# Patient Record
Sex: MALE | Race: WHITE | Employment: OTHER | URBAN - METROPOLITAN AREA
[De-identification: names, ages, dates, MRNs, and addresses within clinical notes are randomized per-mention and may not be internally consistent; named-entity substitution may affect disease eponyms.]

---

## 2017-01-12 DIAGNOSIS — E78.2 MIXED HYPERLIPIDEMIA: Primary | ICD-10-CM

## 2017-01-12 RX ORDER — ATORVASTATIN CALCIUM 20 MG/1
TABLET, FILM COATED ORAL
Qty: 90 TABLET | Refills: 0 | Status: SHIPPED | OUTPATIENT
Start: 2017-01-12 | End: 2017-03-02

## 2017-01-19 ENCOUNTER — OFFICE VISIT (OUTPATIENT)
Dept: FAMILY MEDICINE CLINIC | Facility: CLINIC | Age: 67
End: 2017-01-19

## 2017-01-19 ENCOUNTER — APPOINTMENT (OUTPATIENT)
Dept: LAB | Age: 67
End: 2017-01-19
Attending: FAMILY MEDICINE
Payer: COMMERCIAL

## 2017-01-19 VITALS
HEIGHT: 68.5 IN | SYSTOLIC BLOOD PRESSURE: 110 MMHG | DIASTOLIC BLOOD PRESSURE: 78 MMHG | RESPIRATION RATE: 16 BRPM | TEMPERATURE: 98 F | HEART RATE: 74 BPM | WEIGHT: 216 LBS | BODY MASS INDEX: 32.36 KG/M2

## 2017-01-19 DIAGNOSIS — E78.00 HYPERCHOLESTEROLEMIA: ICD-10-CM

## 2017-01-19 DIAGNOSIS — F41.1 GENERALIZED ANXIETY DISORDER: ICD-10-CM

## 2017-01-19 DIAGNOSIS — E78.00 HYPERCHOLESTEROLEMIA: Primary | ICD-10-CM

## 2017-01-19 LAB
ALBUMIN SERPL-MCNC: 4 G/DL (ref 3.5–4.8)
ALP LIVER SERPL-CCNC: 98 U/L (ref 45–117)
ALT SERPL-CCNC: 26 U/L (ref 17–63)
AST SERPL-CCNC: 17 U/L (ref 15–41)
BILIRUB SERPL-MCNC: 0.4 MG/DL (ref 0.1–2)
BUN BLD-MCNC: 16 MG/DL (ref 8–20)
CALCIUM BLD-MCNC: 9.1 MG/DL (ref 8.3–10.3)
CHLORIDE: 107 MMOL/L (ref 101–111)
CHOLEST SMN-MCNC: 171 MG/DL (ref ?–200)
CO2: 28 MMOL/L (ref 22–32)
CREAT BLD-MCNC: 1.15 MG/DL (ref 0.7–1.3)
GLUCOSE BLD-MCNC: 104 MG/DL (ref 70–99)
HDLC SERPL-MCNC: 43 MG/DL (ref 45–?)
HDLC SERPL: 3.98 {RATIO} (ref ?–4.97)
LDLC SERPL CALC-MCNC: 86 MG/DL (ref ?–130)
M PROTEIN MFR SERPL ELPH: 7.5 G/DL (ref 6.1–8.3)
NONHDLC SERPL-MCNC: 128 MG/DL (ref ?–130)
POTASSIUM SERPL-SCNC: 4.2 MMOL/L (ref 3.6–5.1)
SODIUM SERPL-SCNC: 142 MMOL/L (ref 136–144)
TRIGLYCERIDES: 209 MG/DL (ref ?–150)
VLDL: 42 MG/DL (ref 5–40)

## 2017-01-19 PROCEDURE — 99213 OFFICE O/P EST LOW 20 MIN: CPT | Performed by: FAMILY MEDICINE

## 2017-01-19 PROCEDURE — 80061 LIPID PANEL: CPT

## 2017-01-19 PROCEDURE — 36415 COLL VENOUS BLD VENIPUNCTURE: CPT

## 2017-01-19 PROCEDURE — 80053 COMPREHEN METABOLIC PANEL: CPT

## 2017-01-19 RX ORDER — ATORVASTATIN CALCIUM 20 MG/1
TABLET, FILM COATED ORAL
Qty: 90 TABLET | Refills: 1 | OUTPATIENT
Start: 2017-01-19

## 2017-01-19 RX ORDER — CHOLECALCIFEROL (VITAMIN D3) 50 MCG
TABLET ORAL
COMMUNITY

## 2017-01-19 NOTE — PROGRESS NOTES
[facility] Family Medicine Office Note  Chief Complaint:   Patient presents with:  Medication Follow-Up      HPI:   This is a 73year old male coming in for follow-up of hyperlipidemia and anxiety.       1.  Hyperlipidemia -cholesterol is controll Meds:    Current Outpatient Prescriptions:  Cholecalciferol (VITAMIN D) 2000 UNITS Oral Tab Take by mouth. Disp:  Rfl:    Atorvastatin Calcium 20 MG Oral Tab TAKE 1 TABLET (20 MG TOTAL) BY MOUTH NIGHTLY.  Disp: 90 tablet Rfl: 0   aspirin 81 MG Oral Tab Take continuation of low carb/low fat diet and exercise  -  Repeat fasting lipid panel today  -  F/u in 6 months for annual physical exam    2.  Generalized anxiety disorder  -  Stable  -  Continue zoloft 100mg daily and xanax PRN  -  F/u in 6 months      Meds &

## 2017-01-20 ENCOUNTER — TELEPHONE (OUTPATIENT)
Dept: FAMILY MEDICINE CLINIC | Facility: CLINIC | Age: 67
End: 2017-01-20

## 2017-01-20 DIAGNOSIS — R73.01 IMPAIRED FASTING GLUCOSE: ICD-10-CM

## 2017-01-20 DIAGNOSIS — E78.00 HYPERCHOLESTEROLEMIA: Primary | ICD-10-CM

## 2017-02-13 ENCOUNTER — TELEPHONE (OUTPATIENT)
Dept: FAMILY MEDICINE CLINIC | Facility: CLINIC | Age: 67
End: 2017-02-13

## 2017-03-02 RX ORDER — ATORVASTATIN CALCIUM 20 MG/1
TABLET, FILM COATED ORAL
Qty: 90 TABLET | Refills: 0 | Status: SHIPPED | OUTPATIENT
Start: 2017-03-02 | End: 2017-06-08

## 2017-06-08 RX ORDER — ATORVASTATIN CALCIUM 20 MG/1
TABLET, FILM COATED ORAL
Qty: 90 TABLET | Refills: 0 | Status: SHIPPED | OUTPATIENT
Start: 2017-06-08 | End: 2017-06-30

## 2017-06-29 ENCOUNTER — MA CHART PREP (OUTPATIENT)
Dept: FAMILY MEDICINE CLINIC | Facility: CLINIC | Age: 67
End: 2017-06-29

## 2017-06-29 PROBLEM — E66.811 OBESITY, CLASS I, BMI 30-34.9: Status: ACTIVE | Noted: 2017-06-29

## 2017-06-29 PROBLEM — E66.9 OBESITY, CLASS I, BMI 30-34.9: Status: ACTIVE | Noted: 2017-06-29

## 2017-06-30 ENCOUNTER — OFFICE VISIT (OUTPATIENT)
Dept: FAMILY MEDICINE CLINIC | Facility: CLINIC | Age: 67
End: 2017-06-30

## 2017-06-30 ENCOUNTER — APPOINTMENT (OUTPATIENT)
Dept: LAB | Age: 67
End: 2017-06-30
Attending: FAMILY MEDICINE
Payer: MEDICARE

## 2017-06-30 VITALS
HEART RATE: 90 BPM | TEMPERATURE: 98 F | RESPIRATION RATE: 14 BRPM | HEIGHT: 68.5 IN | SYSTOLIC BLOOD PRESSURE: 120 MMHG | BODY MASS INDEX: 31.91 KG/M2 | WEIGHT: 213 LBS | DIASTOLIC BLOOD PRESSURE: 80 MMHG

## 2017-06-30 DIAGNOSIS — E78.00 HYPERCHOLESTEROLEMIA: ICD-10-CM

## 2017-06-30 DIAGNOSIS — R73.01 IMPAIRED FASTING GLUCOSE: ICD-10-CM

## 2017-06-30 DIAGNOSIS — Z11.59 NEED FOR HEPATITIS C SCREENING TEST: ICD-10-CM

## 2017-06-30 DIAGNOSIS — Z12.11 COLON CANCER SCREENING: ICD-10-CM

## 2017-06-30 DIAGNOSIS — E66.9 OBESITY, CLASS I, BMI 30-34.9: ICD-10-CM

## 2017-06-30 DIAGNOSIS — Z12.5 SPECIAL SCREENING FOR MALIGNANT NEOPLASM OF PROSTATE: ICD-10-CM

## 2017-06-30 DIAGNOSIS — I70.0 AORTIC CALCIFICATION (HCC): ICD-10-CM

## 2017-06-30 DIAGNOSIS — Z00.00 ENCOUNTER FOR MEDICARE ANNUAL WELLNESS EXAM: Primary | ICD-10-CM

## 2017-06-30 DIAGNOSIS — Z00.00 ROUTINE GENERAL MEDICAL EXAMINATION AT A HEALTH CARE FACILITY: ICD-10-CM

## 2017-06-30 DIAGNOSIS — Z23 NEED FOR VACCINATION: ICD-10-CM

## 2017-06-30 DIAGNOSIS — Z12.5 SCREENING FOR PROSTATE CANCER: ICD-10-CM

## 2017-06-30 DIAGNOSIS — F41.1 GENERALIZED ANXIETY DISORDER: ICD-10-CM

## 2017-06-30 LAB
25-HYDROXYVITAMIN D (TOTAL): 34.8 NG/ML (ref 30–100)
ALBUMIN SERPL-MCNC: 4 G/DL (ref 3.5–4.8)
ALP LIVER SERPL-CCNC: 89 U/L (ref 45–117)
ALT SERPL-CCNC: 27 U/L (ref 17–63)
AST SERPL-CCNC: 14 U/L (ref 15–41)
BILIRUB SERPL-MCNC: 0.6 MG/DL (ref 0.1–2)
BILIRUB UR QL STRIP.AUTO: NEGATIVE
BUN BLD-MCNC: 16 MG/DL (ref 8–20)
CALCIUM BLD-MCNC: 9.3 MG/DL (ref 8.3–10.3)
CHLORIDE: 106 MMOL/L (ref 101–111)
CHOLEST SMN-MCNC: 162 MG/DL (ref ?–200)
CLARITY UR REFRACT.AUTO: CLEAR
CO2: 28 MMOL/L (ref 22–32)
COLOR UR AUTO: YELLOW
CREAT BLD-MCNC: 1.25 MG/DL (ref 0.7–1.3)
EST. AVERAGE GLUCOSE BLD GHB EST-MCNC: 126 MG/DL (ref 68–126)
GLUCOSE BLD-MCNC: 101 MG/DL (ref 70–99)
GLUCOSE UR STRIP.AUTO-MCNC: NEGATIVE MG/DL
HBA1C MFR BLD HPLC: 6 % (ref ?–5.7)
HDLC SERPL-MCNC: 40 MG/DL (ref 45–?)
HDLC SERPL: 4.05 {RATIO} (ref ?–4.97)
HEPATITIS C VIRUS AB INTERPRETATION: NONREACTIVE
KETONES UR STRIP.AUTO-MCNC: NEGATIVE MG/DL
LDLC SERPL CALC-MCNC: 68 MG/DL (ref ?–130)
LEUKOCYTE ESTERASE UR QL STRIP.AUTO: NEGATIVE
M PROTEIN MFR SERPL ELPH: 7.7 G/DL (ref 6.1–8.3)
NITRITE UR QL STRIP.AUTO: NEGATIVE
NONHDLC SERPL-MCNC: 122 MG/DL (ref ?–130)
PH UR STRIP.AUTO: 5 [PH] (ref 4.5–8)
POTASSIUM SERPL-SCNC: 4.4 MMOL/L (ref 3.6–5.1)
PROT UR STRIP.AUTO-MCNC: NEGATIVE MG/DL
PSA SERPL-MCNC: 1.79 NG/ML (ref 0.01–4)
RBC UR QL AUTO: NEGATIVE
SODIUM SERPL-SCNC: 141 MMOL/L (ref 136–144)
SP GR UR STRIP.AUTO: 1.02 (ref 1–1.03)
TRIGLYCERIDES: 271 MG/DL (ref ?–150)
TSI SER-ACNC: 2.61 MIU/ML (ref 0.35–5.5)
UROBILINOGEN UR STRIP.AUTO-MCNC: <2 MG/DL
VLDL: 54 MG/DL (ref 5–40)

## 2017-06-30 PROCEDURE — 36415 COLL VENOUS BLD VENIPUNCTURE: CPT | Performed by: FAMILY MEDICINE

## 2017-06-30 PROCEDURE — 80053 COMPREHEN METABOLIC PANEL: CPT | Performed by: FAMILY MEDICINE

## 2017-06-30 PROCEDURE — G0103 PSA SCREENING: HCPCS | Performed by: FAMILY MEDICINE

## 2017-06-30 PROCEDURE — 83036 HEMOGLOBIN GLYCOSYLATED A1C: CPT | Performed by: FAMILY MEDICINE

## 2017-06-30 PROCEDURE — 96160 PT-FOCUSED HLTH RISK ASSMT: CPT | Performed by: FAMILY MEDICINE

## 2017-06-30 PROCEDURE — 90670 PCV13 VACCINE IM: CPT | Performed by: FAMILY MEDICINE

## 2017-06-30 PROCEDURE — 82306 VITAMIN D 25 HYDROXY: CPT | Performed by: FAMILY MEDICINE

## 2017-06-30 PROCEDURE — G0009 ADMIN PNEUMOCOCCAL VACCINE: HCPCS | Performed by: FAMILY MEDICINE

## 2017-06-30 PROCEDURE — 84443 ASSAY THYROID STIM HORMONE: CPT | Performed by: FAMILY MEDICINE

## 2017-06-30 PROCEDURE — G0402 INITIAL PREVENTIVE EXAM: HCPCS | Performed by: FAMILY MEDICINE

## 2017-06-30 PROCEDURE — G0472 HEP C SCREEN HIGH RISK/OTHER: HCPCS | Performed by: FAMILY MEDICINE

## 2017-06-30 PROCEDURE — 81003 URINALYSIS AUTO W/O SCOPE: CPT | Performed by: FAMILY MEDICINE

## 2017-06-30 PROCEDURE — 99397 PER PM REEVAL EST PAT 65+ YR: CPT | Performed by: FAMILY MEDICINE

## 2017-06-30 PROCEDURE — 80061 LIPID PANEL: CPT | Performed by: FAMILY MEDICINE

## 2017-06-30 RX ORDER — SERTRALINE HYDROCHLORIDE 100 MG/1
100 TABLET, FILM COATED ORAL DAILY
Qty: 90 TABLET | Refills: 0 | Status: SHIPPED | OUTPATIENT
Start: 2017-06-30 | End: 2017-08-18

## 2017-06-30 RX ORDER — ALPRAZOLAM 0.25 MG/1
0.25 TABLET ORAL NIGHTLY PRN
Qty: 90 TABLET | Refills: 0 | Status: SHIPPED | OUTPATIENT
Start: 2017-06-30

## 2017-06-30 RX ORDER — ATORVASTATIN CALCIUM 20 MG/1
TABLET, FILM COATED ORAL
Qty: 90 TABLET | Refills: 0 | Status: SHIPPED | OUTPATIENT
Start: 2017-06-30 | End: 2017-08-18

## 2017-06-30 NOTE — PATIENT INSTRUCTIONS
Jaymie Wolf's SCREENING SCHEDULE   Tests on this list are recommended by your physician but may not be covered, or covered at this frequency, by your insurer. Please check with your insurance carrier before scheduling to verify coverage.     PREVENTATIV No results found for this or any previous visit.  Limited to patients who meet one of the following criteria:   • Men who are 73-68 years old and have smoked more than 100 cigarettes in their lifetime   • Anyone with a family history    Colorectal Cancer Sc for the mentally retarded   Persons who live in the same house as a HepB virus carrier   Homosexual men   Illicit injectable drug abusers     Tetanus Toxoid- Only covered with a cut with metal- TD and TDaP Not covered by Medicare Part B) No orders found fo 89 mm Hg   Colorectal cancer All men in this age group Flexible sigmoidoscopy every 5 years, or colonoscopy every 10 years, or double-contrast barium enema every 5 years; yearly fecal occult blood test or fecal immunochemical test; or a stool DNA test as o healthcare provider 2 doses given at least 6 months apart   Hepatitis B Men at increased risk for infection – talk with your healthcare provider 3 doses over 6 months; second dose should be given 1 month after the first dose; the third dose should be given

## 2017-06-30 NOTE — PROGRESS NOTES
HPI:   Princess Teague is a 73year old male who presents for a MA (Medicare Advantage) [address] [facility] (Once per calendar year). Patient has no complaints or concerns today. Denies any recent illnesses or hospitalizations.   His tetanus vaccine is up-to-nolan (6/12); URI (upper respiratory infection) (2/29/12); and Vascular calcification (2/17/2012). He  has a past surgical history that includes cardiac catherterization (pbp) (1/24/12).     His family history includes AHD in an other family member; CABG in hi Eyes Visual Acuity: Corrected Both Eyes Chart Acuity: 20/20   Able To Tolerate Visual Acuity: Yes      General Appearance:  Alert, cooperative, no distress, appears stated age   Head:  Normocephalic, without obvious abnormality, atraumatic   Eyes:  PERRL, prevnar done today    Special screening for malignant neoplasm of prostate  -     PSA;  Future  -  Check PSA and prostate exam done today    Obesity, Class I, BMI 30-34.9  -  Encourage low fat/low carb diet and exercise    Hypercholesterolemia  -     atorva daily physical activity?: Light    How would you describe your current health state?: Fair    How do you maintain positive mental well-being?: Social Interaction; Visiting Friends    If you are a male age 38-65 or a female age 47-67, do you take aspirin?: Y your progress note to see your input here.   Cognitive Assessment     What day of the week is this?: Correct    What month is it?: Correct    What year is it?: Correct    Recall \"Ball\": Correct    Recall \"Flag\": Correct    Recall \"Tree\": Correct previous visit. Hepatitis B No orders found for this or any previous visit. Tetanus No orders found for this or any previous visit.          SPECIFIC DISEASE MONITORING Internal Lab or Procedure External Lab or Procedure   Annual Monitoring of Persi

## 2017-07-03 DIAGNOSIS — E78.00 HYPERCHOLESTEROLEMIA: ICD-10-CM

## 2017-07-03 DIAGNOSIS — R73.01 IMPAIRED FASTING GLUCOSE: Primary | ICD-10-CM

## 2017-08-11 ENCOUNTER — TELEPHONE (OUTPATIENT)
Dept: FAMILY MEDICINE CLINIC | Facility: CLINIC | Age: 67
End: 2017-08-11

## 2017-08-11 NOTE — TELEPHONE ENCOUNTER
Pt states Xanax refill from our office was for once daily and #90 supply. Pt states Dr Ayleen Cantu , psych. fills Xanax for BID. 's instructions is that pt needs to get Xanax filled by Dr Ayleen Cantu in the future. Pt voices understanding.   This message is DIMITRIS

## 2017-08-18 DIAGNOSIS — F41.1 GENERALIZED ANXIETY DISORDER: ICD-10-CM

## 2017-08-18 DIAGNOSIS — E78.00 HYPERCHOLESTEROLEMIA: ICD-10-CM

## 2017-08-18 RX ORDER — ATORVASTATIN CALCIUM 20 MG/1
TABLET, FILM COATED ORAL
Qty: 90 TABLET | Refills: 0 | Status: SHIPPED | OUTPATIENT
Start: 2017-08-18 | End: 2017-10-25

## 2017-08-18 RX ORDER — SERTRALINE HYDROCHLORIDE 100 MG/1
TABLET, FILM COATED ORAL
Qty: 90 TABLET | Refills: 0 | Status: SHIPPED | OUTPATIENT
Start: 2017-08-18 | End: 2021-06-30 | Stop reason: ALTCHOICE

## 2017-09-05 ENCOUNTER — TELEPHONE (OUTPATIENT)
Dept: FAMILY MEDICINE CLINIC | Facility: CLINIC | Age: 67
End: 2017-09-05

## 2017-09-05 NOTE — TELEPHONE ENCOUNTER
Per pt , his insurance company sent him a \"FIT\" kit to collect the stool for colon CA screening. Pt states the test was mailed to [facility].  Pt states he will get a copy of the results and bring our office a copy for our records.

## 2017-10-25 DIAGNOSIS — E78.00 HYPERCHOLESTEROLEMIA: ICD-10-CM

## 2017-10-26 RX ORDER — ATORVASTATIN CALCIUM 20 MG/1
TABLET, FILM COATED ORAL
Qty: 90 TABLET | Refills: 0 | Status: SHIPPED | OUTPATIENT
Start: 2017-10-26 | End: 2017-12-13

## 2017-12-13 DIAGNOSIS — E78.00 HYPERCHOLESTEROLEMIA: ICD-10-CM

## 2017-12-13 RX ORDER — ATORVASTATIN CALCIUM 20 MG/1
TABLET, FILM COATED ORAL
Qty: 90 TABLET | Refills: 1 | Status: SHIPPED | OUTPATIENT
Start: 2017-12-13 | End: 2018-01-18

## 2018-01-18 ENCOUNTER — APPOINTMENT (OUTPATIENT)
Dept: LAB | Age: 68
End: 2018-01-18
Attending: FAMILY MEDICINE
Payer: MEDICARE

## 2018-01-18 ENCOUNTER — OFFICE VISIT (OUTPATIENT)
Dept: FAMILY MEDICINE CLINIC | Facility: CLINIC | Age: 68
End: 2018-01-18

## 2018-01-18 VITALS
HEART RATE: 80 BPM | DIASTOLIC BLOOD PRESSURE: 74 MMHG | HEIGHT: 68.5 IN | SYSTOLIC BLOOD PRESSURE: 118 MMHG | BODY MASS INDEX: 32.51 KG/M2 | WEIGHT: 217 LBS | TEMPERATURE: 98 F | RESPIRATION RATE: 16 BRPM

## 2018-01-18 DIAGNOSIS — R73.01 IMPAIRED FASTING GLUCOSE: ICD-10-CM

## 2018-01-18 DIAGNOSIS — E78.2 MIXED HYPERLIPIDEMIA: Primary | ICD-10-CM

## 2018-01-18 DIAGNOSIS — E78.00 HYPERCHOLESTEROLEMIA: ICD-10-CM

## 2018-01-18 DIAGNOSIS — E78.2 MIXED HYPERLIPIDEMIA: ICD-10-CM

## 2018-01-18 LAB
ALBUMIN SERPL-MCNC: 3.8 G/DL (ref 3.5–4.8)
ALP LIVER SERPL-CCNC: 94 U/L (ref 45–117)
ALT SERPL-CCNC: 20 U/L (ref 17–63)
AST SERPL-CCNC: 15 U/L (ref 15–41)
BILIRUB SERPL-MCNC: 0.6 MG/DL (ref 0.1–2)
BUN BLD-MCNC: 17 MG/DL (ref 8–20)
CALCIUM BLD-MCNC: 8.5 MG/DL (ref 8.3–10.3)
CHLORIDE: 107 MMOL/L (ref 101–111)
CHOLEST SMN-MCNC: 146 MG/DL (ref ?–200)
CO2: 25 MMOL/L (ref 22–32)
CREAT BLD-MCNC: 1.19 MG/DL (ref 0.7–1.3)
EST. AVERAGE GLUCOSE BLD GHB EST-MCNC: 120 MG/DL (ref 68–126)
GLUCOSE BLD-MCNC: 99 MG/DL (ref 70–99)
HBA1C MFR BLD HPLC: 5.8 % (ref ?–5.7)
HDLC SERPL-MCNC: 33 MG/DL (ref 45–?)
HDLC SERPL: 4.42 {RATIO} (ref ?–4.97)
LDLC SERPL CALC-MCNC: 61 MG/DL (ref ?–130)
M PROTEIN MFR SERPL ELPH: 7.4 G/DL (ref 6.1–8.3)
NONHDLC SERPL-MCNC: 113 MG/DL (ref ?–130)
POTASSIUM SERPL-SCNC: 4.1 MMOL/L (ref 3.6–5.1)
SODIUM SERPL-SCNC: 140 MMOL/L (ref 136–144)
TRIGL SERPL-MCNC: 260 MG/DL (ref ?–150)
VLDLC SERPL CALC-MCNC: 52 MG/DL (ref 5–40)

## 2018-01-18 PROCEDURE — 36415 COLL VENOUS BLD VENIPUNCTURE: CPT | Performed by: FAMILY MEDICINE

## 2018-01-18 PROCEDURE — 80061 LIPID PANEL: CPT | Performed by: FAMILY MEDICINE

## 2018-01-18 PROCEDURE — 90653 IIV ADJUVANT VACCINE IM: CPT | Performed by: FAMILY MEDICINE

## 2018-01-18 PROCEDURE — 83036 HEMOGLOBIN GLYCOSYLATED A1C: CPT | Performed by: FAMILY MEDICINE

## 2018-01-18 PROCEDURE — 99213 OFFICE O/P EST LOW 20 MIN: CPT | Performed by: FAMILY MEDICINE

## 2018-01-18 PROCEDURE — G0008 ADMIN INFLUENZA VIRUS VAC: HCPCS | Performed by: FAMILY MEDICINE

## 2018-01-18 PROCEDURE — 80053 COMPREHEN METABOLIC PANEL: CPT | Performed by: FAMILY MEDICINE

## 2018-01-18 RX ORDER — ATORVASTATIN CALCIUM 20 MG/1
TABLET, FILM COATED ORAL
Qty: 90 TABLET | Refills: 1 | Status: SHIPPED | OUTPATIENT
Start: 2018-01-18 | End: 2018-01-19

## 2018-01-18 NOTE — PROGRESS NOTES
[facility] Family Medicine Office Note  Chief Complaint:   Patient presents with:  Medication Follow-Up: anxiety depression, cholesterol       HPI:   This is a 78year old male coming in for follow-up of hyperlipidemia.   Cholesterol is controlled Tab Take 1 tablet by mouth  daily Disp: 90 tablet Rfl: 0   ALPRAZolam 0.25 MG Oral Tab Take 1 tablet (0.25 mg total) by mouth nightly as needed. Disp: 90 tablet Rfl: 0   Cholecalciferol (VITAMIN D) 2000 UNITS Oral Tab Take by mouth.  Disp:  Rfl:    aspirin exam      Meds & Refills for this Visit:    Signed Prescriptions Disp Refills    atorvastatin 20 MG Oral Tab 90 tablet 1      Sig: TAKE 1 TABLET BY MOUTH  NIGHTLY           Health Maintenance:  Colonoscopy,10 Years due on 12/11/2001    Patient/Caregiver Ed

## 2018-01-19 ENCOUNTER — TELEPHONE (OUTPATIENT)
Dept: FAMILY MEDICINE CLINIC | Facility: CLINIC | Age: 68
End: 2018-01-19

## 2018-01-19 DIAGNOSIS — E78.2 MIXED HYPERLIPIDEMIA: ICD-10-CM

## 2018-01-19 RX ORDER — ATORVASTATIN CALCIUM 20 MG/1
TABLET, FILM COATED ORAL
Qty: 90 TABLET | Refills: 1 | Status: SHIPPED | OUTPATIENT
Start: 2018-01-19 | End: 2018-01-26

## 2018-01-19 NOTE — TELEPHONE ENCOUNTER
Pt saw Dr. Jody Arroyo yesterday and received a refill of atorvastatin 20 MG Oral Tab; however, refill was sent to [facility]. Pt requested that this script be filled through [address] [place] [address], [address] [place] [address] [phone], [phone].     Pt

## 2018-01-19 NOTE — TELEPHONE ENCOUNTER
Record confirms info noted below by pt-rx was for #90 RFx1. Redo of same atorvastatin order done now to mail order pharmacy as pt requests.

## 2018-01-26 RX ORDER — ATORVASTATIN CALCIUM 20 MG/1
TABLET, FILM COATED ORAL
Qty: 90 TABLET | Refills: 1 | Status: SHIPPED | OUTPATIENT
Start: 2018-01-26 | End: 2018-07-09

## 2018-01-26 RX ORDER — ATORVASTATIN CALCIUM 20 MG/1
TABLET, FILM COATED ORAL
Qty: 90 TABLET | Refills: 1 | Status: SHIPPED | OUTPATIENT
Start: 2018-01-26 | End: 2018-01-26

## 2018-01-26 NOTE — TELEPHONE ENCOUNTER
Pt called stating he still does not have his atorvastatin from [facility]. States he spoke with [facility] 1/19 and they relayed they still did not have a request for the medication and nothing has arrived via mail to pt as of yet.  Informed pt that this script was

## 2018-01-26 NOTE — TELEPHONE ENCOUNTER
I have called [facility] on this. They had rec'd our requests but pt insurance is saying it is too soon to fill bc he has been charged by [facility] for the rx that was sent already 1/19. Per below pt did not .  Per [facility] we need to contact [facility] and have th

## 2018-05-31 ENCOUNTER — PATIENT OUTREACH (OUTPATIENT)
Dept: FAMILY MEDICINE CLINIC | Facility: CLINIC | Age: 68
End: 2018-05-31

## 2018-05-31 NOTE — PROGRESS NOTES
Please call pt to inquire if pt has had a colonoscopy in the last 10 years and if so who performed it (name and phone #). Please let Genejody Larger know so I can obtain the report.     If pt did not have a colonoscopy please advise them we will leave the FIT stoo

## 2018-06-13 DIAGNOSIS — E78.2 MIXED HYPERLIPIDEMIA: ICD-10-CM

## 2018-06-13 RX ORDER — ATORVASTATIN CALCIUM 20 MG/1
TABLET, FILM COATED ORAL
Qty: 90 TABLET | Refills: 0 | Status: SHIPPED | OUTPATIENT
Start: 2018-06-13 | End: 2018-08-12

## 2018-07-09 ENCOUNTER — APPOINTMENT (OUTPATIENT)
Dept: LAB | Age: 68
End: 2018-07-09
Attending: FAMILY MEDICINE
Payer: MEDICARE

## 2018-07-09 ENCOUNTER — OFFICE VISIT (OUTPATIENT)
Dept: FAMILY MEDICINE CLINIC | Facility: CLINIC | Age: 68
End: 2018-07-09

## 2018-07-09 VITALS
WEIGHT: 214 LBS | RESPIRATION RATE: 16 BRPM | BODY MASS INDEX: 32.06 KG/M2 | SYSTOLIC BLOOD PRESSURE: 120 MMHG | HEART RATE: 74 BPM | TEMPERATURE: 98 F | HEIGHT: 68.5 IN | DIASTOLIC BLOOD PRESSURE: 80 MMHG

## 2018-07-09 DIAGNOSIS — E78.2 MIXED HYPERLIPIDEMIA: ICD-10-CM

## 2018-07-09 DIAGNOSIS — I70.0 AORTIC CALCIFICATION (HCC): ICD-10-CM

## 2018-07-09 DIAGNOSIS — Z12.5 PROSTATE CANCER SCREENING: ICD-10-CM

## 2018-07-09 DIAGNOSIS — Z12.11 SCREENING FOR COLON CANCER: ICD-10-CM

## 2018-07-09 DIAGNOSIS — E66.9 OBESITY, CLASS I, BMI 30-34.9: ICD-10-CM

## 2018-07-09 DIAGNOSIS — R73.01 IMPAIRED FASTING GLUCOSE: ICD-10-CM

## 2018-07-09 DIAGNOSIS — Z23 NEED FOR VACCINATION: ICD-10-CM

## 2018-07-09 DIAGNOSIS — F41.1 GENERALIZED ANXIETY DISORDER: ICD-10-CM

## 2018-07-09 DIAGNOSIS — Z00.00 ENCOUNTER FOR MEDICARE ANNUAL WELLNESS EXAM: Primary | ICD-10-CM

## 2018-07-09 LAB
ALBUMIN SERPL-MCNC: 3.7 G/DL (ref 3.5–4.8)
ALP LIVER SERPL-CCNC: 82 U/L (ref 45–117)
ALT SERPL-CCNC: 21 U/L (ref 17–63)
AST SERPL-CCNC: 14 U/L (ref 15–41)
BILIRUB SERPL-MCNC: 0.6 MG/DL (ref 0.1–2)
BUN BLD-MCNC: 15 MG/DL (ref 8–20)
CALCIUM BLD-MCNC: 8.9 MG/DL (ref 8.3–10.3)
CHLORIDE: 108 MMOL/L (ref 101–111)
CHOLEST SMN-MCNC: 124 MG/DL (ref ?–200)
CO2: 24 MMOL/L (ref 22–32)
COMPLEXED PSA SERPL-MCNC: 1.88 NG/ML (ref 0.01–4)
CREAT BLD-MCNC: 1.14 MG/DL (ref 0.7–1.3)
EST. AVERAGE GLUCOSE BLD GHB EST-MCNC: 120 MG/DL (ref 68–126)
GLUCOSE BLD-MCNC: 106 MG/DL (ref 70–99)
HBA1C MFR BLD HPLC: 5.8 % (ref ?–5.7)
HDLC SERPL-MCNC: 35 MG/DL (ref 45–?)
HDLC SERPL: 3.54 {RATIO} (ref ?–4.97)
LDLC SERPL CALC-MCNC: 52 MG/DL (ref ?–130)
M PROTEIN MFR SERPL ELPH: 7.1 G/DL (ref 6.1–8.3)
NONHDLC SERPL-MCNC: 89 MG/DL (ref ?–130)
POTASSIUM SERPL-SCNC: 4.3 MMOL/L (ref 3.6–5.1)
SODIUM SERPL-SCNC: 141 MMOL/L (ref 136–144)
TRIGL SERPL-MCNC: 183 MG/DL (ref ?–150)
VLDLC SERPL CALC-MCNC: 37 MG/DL (ref 5–40)

## 2018-07-09 PROCEDURE — 90732 PPSV23 VACC 2 YRS+ SUBQ/IM: CPT | Performed by: FAMILY MEDICINE

## 2018-07-09 PROCEDURE — 36415 COLL VENOUS BLD VENIPUNCTURE: CPT

## 2018-07-09 PROCEDURE — 99397 PER PM REEVAL EST PAT 65+ YR: CPT | Performed by: FAMILY MEDICINE

## 2018-07-09 PROCEDURE — 83036 HEMOGLOBIN GLYCOSYLATED A1C: CPT

## 2018-07-09 PROCEDURE — G0009 ADMIN PNEUMOCOCCAL VACCINE: HCPCS | Performed by: FAMILY MEDICINE

## 2018-07-09 PROCEDURE — 80053 COMPREHEN METABOLIC PANEL: CPT

## 2018-07-09 PROCEDURE — G0438 PPPS, INITIAL VISIT: HCPCS | Performed by: FAMILY MEDICINE

## 2018-07-09 PROCEDURE — 96160 PT-FOCUSED HLTH RISK ASSMT: CPT | Performed by: FAMILY MEDICINE

## 2018-07-09 PROCEDURE — 80061 LIPID PANEL: CPT

## 2018-07-09 NOTE — PATIENT INSTRUCTIONS
Max Wolf's SCREENING SCHEDULE   Tests on this list are recommended by your physician but may not be covered, or covered at this frequency, by your insurer. Please check with your insurance carrier before scheduling to verify coverage.     PREVENTATIV results found for this or any previous visit.  Limited to patients who meet one of the following criteria:   • Men who are 73-68 years old and have smoked more than 100 cigarettes in their lifetime   • Anyone with a family history    Colorectal Cancer Scree concentrates   Clients of institutions for the mentally retarded   Persons who live in the same house as a HepB virus carrier   Homosexual men   Illicit injectable drug abusers     Tetanus Toxoid- Only covered with a cut with metal- TD and TDaP Not covered

## 2018-07-09 NOTE — PROGRESS NOTES
HPI:   Marco Schulz is a 78year old male who presents for a MA (Medicare Advantage) [address] (Once per calendar year). Patient has no complaints or concerns today. Denies any recent illnesses or hospitalizations.   His tetanus vaccine is up-to-nolan calcification ([facility] [address] (1/24/12); Cardiac infarction (1/12); Hypercholesterolemia; Laboratory test (1/6/12); Lipid screening (1/4/12); Routine general medical examination at a health care facility (12/29/2011);  Stye (6/12); URI (upper respiratory infection) ( General Appearance:  Alert, cooperative, no distress, appears stated age   Head:  Normocephalic, without obvious abnormality, atraumatic   Eyes:  PERRL, conjunctiva/corneas clear, EOM's intact, both eyes   Ears:  Normal TM's and external ear ca carb diet and exercise    Mixed hyperlipidemia  -  Recheck fasting lipid panel  -  Adjust lipitor accordingly  -  Encourage low fat/low carb intake    Aortic calcification (HCC)  -  Monitored by cardiology  -  Check ultrafast heart scan    Generalized anxi help    Eating: Able without help    Driving: Able without help    Preparing your meals: Able without help    Managing money/bills: Able without help    Taking medications as prescribed: Able without help    Are you able to afford your medications?: Yes Hgb)   Date Value   08/29/2014 5.9 (H)     HgbA1C (%)   Date Value   01/18/2018 5.8 (H)       No flowsheet data found.     Fasting Blood Sugar (FSB)Annually   Glucose (mg/dL)   Date Value   01/18/2018 99   ----------  GLUCOSE (mg/dL)   Date Value   08/29/20 data found. Creatinine  Annually CREATININE (mg/dL)   Date Value   08/29/2014 1.02     Creatinine (mg/dL)   Date Value   01/18/2018 1.19    No flowsheet data found.     Drug Serum Conc  Annually No results found for: DIGOXIN, DIG, VALP No flowsheet data

## 2018-07-30 ENCOUNTER — OFFICE VISIT (OUTPATIENT)
Dept: SURGERY | Facility: CLINIC | Age: 68
End: 2018-07-30
Payer: COMMERCIAL

## 2018-07-30 VITALS
DIASTOLIC BLOOD PRESSURE: 74 MMHG | WEIGHT: 214 LBS | HEIGHT: 67 IN | HEART RATE: 70 BPM | BODY MASS INDEX: 33.59 KG/M2 | SYSTOLIC BLOOD PRESSURE: 127 MMHG | TEMPERATURE: 99 F

## 2018-07-30 DIAGNOSIS — G47.33 OBSTRUCTIVE SLEEP APNEA ON CPAP: ICD-10-CM

## 2018-07-30 DIAGNOSIS — Z12.11 ENCOUNTER FOR SCREENING COLONOSCOPY: Primary | ICD-10-CM

## 2018-07-30 DIAGNOSIS — Z99.89 OBSTRUCTIVE SLEEP APNEA ON CPAP: ICD-10-CM

## 2018-07-30 DIAGNOSIS — Z99.89 CPAP (CONTINUOUS POSITIVE AIRWAY PRESSURE) DEPENDENCE: ICD-10-CM

## 2018-07-30 PROBLEM — Z00.00 ENCOUNTER FOR MEDICARE ANNUAL WELLNESS EXAM: Status: RESOLVED | Noted: 2018-07-09 | Resolved: 2018-07-30

## 2018-07-30 RX ORDER — POLYETHYLENE GLYCOL 3350, SODIUM CHLORIDE, SODIUM BICARBONATE, POTASSIUM CHLORIDE 420; 11.2; 5.72; 1.48 G/4L; G/4L; G/4L; G/4L
POWDER, FOR SOLUTION ORAL
Qty: 1 BOTTLE | Status: SHIPPED | OUTPATIENT
Start: 2018-07-30 | End: 2019-07-10 | Stop reason: ALTCHOICE

## 2018-07-30 NOTE — H&P
New Patient Visit Note       Active Problems      1. Encounter for screening colonoscopy    2. CPAP (continuous positive airway pressure) dependence    3.  Obstructive sleep apnea on CPAP        Chief Complaint   Patient presents with:  Colonoscopy Screenin Diagnosis Date   • Acute URI 2/1/12   • Anxiety disorder    • Aortic calcification ([facility] [address] 1/24/12   • Cardiac infarction 1/12    small,nontransmural infarction involving the apical anterior wall.  No evidence of ischemia   • Hypercholesterolemia    • Labor TABLET BY MOUTH  NIGHTLY Disp: 90 tablet Rfl: 0   SERTRALINE  MG Oral Tab Take 1 tablet by mouth  daily Disp: 90 tablet Rfl: 0   ALPRAZolam 0.25 MG Oral Tab Take 1 tablet (0.25 mg total) by mouth nightly as needed.  Disp: 90 tablet Rfl: 0   Cholecalc normal heart sounds. No murmur heard. Pulmonary/Chest: Effort normal and breath sounds normal. No accessory muscle usage. No tachypnea. No respiratory distress. He has no decreased breath sounds. He has no wheezes. He has no rhonchi. He has no rales.  H distention. He has not suffered weight loss as a symptom. The patient has no first-degree or second-degree relatives with colon polyps, colon cancer, or cancer the uterus. The patient has had no previous abdominal operations.   The patient does have

## 2018-07-31 NOTE — PATIENT INSTRUCTIONS
I am seeing this patient at the request of the primary care service regarding colonoscopy. The patient is 78years old, he has not had a previous colonoscopy. He has no personal history of polyps or colon cancer.     He has no blood in the stool or me

## 2018-08-12 DIAGNOSIS — E78.2 MIXED HYPERLIPIDEMIA: ICD-10-CM

## 2018-08-13 RX ORDER — ATORVASTATIN CALCIUM 20 MG/1
TABLET, FILM COATED ORAL
Qty: 90 TABLET | Refills: 0 | Status: SHIPPED | OUTPATIENT
Start: 2018-08-13 | End: 2018-10-25

## 2018-08-31 ENCOUNTER — TELEPHONE (OUTPATIENT)
Dept: SURGERY | Facility: CLINIC | Age: 68
End: 2018-08-31

## 2018-08-31 NOTE — TELEPHONE ENCOUNTER
Called in [place] at [facility] on [address] in [place], per pharmacist they are out of [place] called in [place] for pt. Pt informed.

## 2018-10-25 DIAGNOSIS — E78.2 MIXED HYPERLIPIDEMIA: ICD-10-CM

## 2018-10-25 RX ORDER — ATORVASTATIN CALCIUM 20 MG/1
TABLET, FILM COATED ORAL
Qty: 90 TABLET | Refills: 1 | Status: SHIPPED | OUTPATIENT
Start: 2018-10-25 | End: 2019-06-24

## 2019-06-24 DIAGNOSIS — E78.2 MIXED HYPERLIPIDEMIA: ICD-10-CM

## 2019-06-24 RX ORDER — ATORVASTATIN CALCIUM 20 MG/1
TABLET, FILM COATED ORAL
Qty: 90 TABLET | Refills: 1 | Status: SHIPPED | OUTPATIENT
Start: 2019-06-24 | End: 2020-01-14

## 2019-07-10 ENCOUNTER — APPOINTMENT (OUTPATIENT)
Dept: LAB | Age: 69
End: 2019-07-10
Attending: FAMILY MEDICINE
Payer: MEDICARE

## 2019-07-10 ENCOUNTER — OFFICE VISIT (OUTPATIENT)
Dept: FAMILY MEDICINE CLINIC | Facility: CLINIC | Age: 69
End: 2019-07-10
Payer: COMMERCIAL

## 2019-07-10 VITALS
HEIGHT: 68 IN | WEIGHT: 214 LBS | HEART RATE: 76 BPM | BODY MASS INDEX: 32.43 KG/M2 | RESPIRATION RATE: 16 BRPM | DIASTOLIC BLOOD PRESSURE: 70 MMHG | TEMPERATURE: 99 F | SYSTOLIC BLOOD PRESSURE: 120 MMHG

## 2019-07-10 DIAGNOSIS — E78.2 MIXED HYPERLIPIDEMIA: ICD-10-CM

## 2019-07-10 DIAGNOSIS — F41.1 GENERALIZED ANXIETY DISORDER: ICD-10-CM

## 2019-07-10 DIAGNOSIS — Z12.5 PROSTATE CANCER SCREENING: ICD-10-CM

## 2019-07-10 DIAGNOSIS — R73.01 IMPAIRED FASTING GLUCOSE: ICD-10-CM

## 2019-07-10 DIAGNOSIS — E66.9 OBESITY, CLASS I, BMI 30-34.9: ICD-10-CM

## 2019-07-10 DIAGNOSIS — G47.33 OBSTRUCTIVE SLEEP APNEA ON CPAP: ICD-10-CM

## 2019-07-10 DIAGNOSIS — Z99.89 OBSTRUCTIVE SLEEP APNEA ON CPAP: ICD-10-CM

## 2019-07-10 DIAGNOSIS — Z00.00 ENCOUNTER FOR MEDICARE ANNUAL WELLNESS EXAM: Primary | ICD-10-CM

## 2019-07-10 DIAGNOSIS — I70.0 AORTIC CALCIFICATION (HCC): ICD-10-CM

## 2019-07-10 LAB
ALBUMIN SERPL-MCNC: 3.8 G/DL (ref 3.4–5)
ALBUMIN/GLOB SERPL: 1.1 {RATIO} (ref 1–2)
ALP LIVER SERPL-CCNC: 85 U/L (ref 45–117)
ALT SERPL-CCNC: 23 U/L (ref 16–61)
ANION GAP SERPL CALC-SCNC: 6 MMOL/L (ref 0–18)
AST SERPL-CCNC: 23 U/L (ref 15–37)
BASOPHILS # BLD AUTO: 0.02 X10(3) UL (ref 0–0.2)
BASOPHILS NFR BLD AUTO: 0.4 %
BILIRUB SERPL-MCNC: 0.8 MG/DL (ref 0.1–2)
BUN BLD-MCNC: 16 MG/DL (ref 7–18)
BUN/CREAT SERPL: 14 (ref 10–20)
CALCIUM BLD-MCNC: 8.7 MG/DL (ref 8.5–10.1)
CHLORIDE SERPL-SCNC: 111 MMOL/L (ref 98–112)
CHOLEST SMN-MCNC: 150 MG/DL (ref ?–200)
CO2 SERPL-SCNC: 26 MMOL/L (ref 21–32)
CREAT BLD-MCNC: 1.14 MG/DL (ref 0.7–1.3)
DEPRECATED RDW RBC AUTO: 43.6 FL (ref 35.1–46.3)
EOSINOPHIL # BLD AUTO: 0.06 X10(3) UL (ref 0–0.7)
EOSINOPHIL NFR BLD AUTO: 1.1 %
ERYTHROCYTE [DISTWIDTH] IN BLOOD BY AUTOMATED COUNT: 12.9 % (ref 11–15)
EST. AVERAGE GLUCOSE BLD GHB EST-MCNC: 128 MG/DL (ref 68–126)
GLOBULIN PLAS-MCNC: 3.4 G/DL (ref 2.8–4.4)
GLUCOSE BLD-MCNC: 96 MG/DL (ref 70–99)
HBA1C MFR BLD HPLC: 6.1 % (ref ?–5.7)
HCT VFR BLD AUTO: 45.4 % (ref 39–53)
HDLC SERPL-MCNC: 37 MG/DL (ref 40–59)
HGB BLD-MCNC: 14.9 G/DL (ref 13–17.5)
IMM GRANULOCYTES # BLD AUTO: 0.02 X10(3) UL (ref 0–1)
IMM GRANULOCYTES NFR BLD: 0.4 %
LDLC SERPL CALC-MCNC: 84 MG/DL (ref ?–100)
LYMPHOCYTES # BLD AUTO: 1.88 X10(3) UL (ref 1–4)
LYMPHOCYTES NFR BLD AUTO: 35.5 %
M PROTEIN MFR SERPL ELPH: 7.2 G/DL (ref 6.4–8.2)
MCH RBC QN AUTO: 30.2 PG (ref 26–34)
MCHC RBC AUTO-ENTMCNC: 32.8 G/DL (ref 31–37)
MCV RBC AUTO: 92.1 FL (ref 80–100)
MONOCYTES # BLD AUTO: 0.54 X10(3) UL (ref 0.1–1)
MONOCYTES NFR BLD AUTO: 10.2 %
NEUTROPHILS # BLD AUTO: 2.77 X10 (3) UL (ref 1.5–7.7)
NEUTROPHILS # BLD AUTO: 2.77 X10(3) UL (ref 1.5–7.7)
NEUTROPHILS NFR BLD AUTO: 52.4 %
NONHDLC SERPL-MCNC: 113 MG/DL (ref ?–130)
OSMOLALITY SERPL CALC.SUM OF ELEC: 297 MOSM/KG (ref 275–295)
PLATELET # BLD AUTO: 179 10(3)UL (ref 150–450)
POTASSIUM SERPL-SCNC: 4.2 MMOL/L (ref 3.5–5.1)
PSA SERPL-MCNC: 1.78 NG/ML (ref ?–4)
RBC # BLD AUTO: 4.93 X10(6)UL (ref 3.8–5.8)
SODIUM SERPL-SCNC: 143 MMOL/L (ref 136–145)
TRIGL SERPL-MCNC: 145 MG/DL (ref 30–149)
VLDLC SERPL CALC-MCNC: 29 MG/DL (ref 0–30)
WBC # BLD AUTO: 5.3 X10(3) UL (ref 4–11)

## 2019-07-10 PROCEDURE — 99397 PER PM REEVAL EST PAT 65+ YR: CPT | Performed by: FAMILY MEDICINE

## 2019-07-10 PROCEDURE — 96160 PT-FOCUSED HLTH RISK ASSMT: CPT | Performed by: FAMILY MEDICINE

## 2019-07-10 PROCEDURE — G0439 PPPS, SUBSEQ VISIT: HCPCS | Performed by: FAMILY MEDICINE

## 2019-07-10 NOTE — PROGRESS NOTES
HPI:   Altagracia Topete is a 80year old male who presents for a MA (Medicare Advantage) [facility] (Once per calendar year). Patient has no complaints or concerns today. Denies any recent illnesses or hospitalizations.   His tetanus vaccine is up-to-nolan Cardiac infarction (1/12), Hypercholesterolemia, Laboratory test (1/6/12), Lipid screening (1/4/12), Routine general medical examination at a health care facility (12/29/2011), Stye (6/12), URI (upper respiratory infection) (2/29/12), and Vascular calcific Corrected Right Eye Chart Acuity: 20/20   Left Eye Visual Acuity: Corrected Left Eye Chart Acuity: 20/20   Both Eyes Visual Acuity: Corrected Both Eyes Chart Acuity: 20/20   Able To Tolerate Visual Acuity: Yes      General Appearance:  Alert, cooperative, Pneumovax up to date    TINA on CPAP  -  Stable  -  Continue CPAP    Special screening for malignant neoplasm of prostate  -     PSA;  Future  -  Check PSA and prostate exam done today    Obesity, Class I, BMI 30-34.9  -  Encourage low fat/low carb diet and help    Driving: Able without help    Preparing your meals: Able without help    Managing money/bills: Able without help    Taking medications as prescribed: Able without help    Are you able to afford your medications?: Yes    Hearing Problems?: No     Fu 07/09/2018 52     LDL-CHOLESTEROL (mg/dL (calc))   Date Value   08/29/2014 85        EKG - w/ Initial Preventative Physical Exam only, or if medically necessary Electrocardiogram date    Colorectal Cancer Screening      Colonoscopy Screen every 10 years Date Value   07/09/2018 5.8 (H)       No flowsheet data found.     Creat/alb ratio  Annually      LDL  Annually LDL Cholesterol (mg/dL)   Date Value   07/09/2018 52     LDL-CHOLESTEROL (mg/dL (calc))   Date Value   08/29/2014 85    No flowsheet data found

## 2020-01-14 DIAGNOSIS — E78.2 MIXED HYPERLIPIDEMIA: ICD-10-CM

## 2020-01-14 RX ORDER — ATORVASTATIN CALCIUM 20 MG/1
TABLET, FILM COATED ORAL
Qty: 90 TABLET | Refills: 0 | Status: SHIPPED | OUTPATIENT
Start: 2020-01-14 | End: 2020-06-04

## 2020-06-04 DIAGNOSIS — E78.2 MIXED HYPERLIPIDEMIA: ICD-10-CM

## 2020-06-04 RX ORDER — ATORVASTATIN CALCIUM 20 MG/1
TABLET, FILM COATED ORAL
Qty: 90 TABLET | Refills: 0 | Status: SHIPPED | OUTPATIENT
Start: 2020-06-04 | End: 2020-09-11

## 2020-06-30 ENCOUNTER — MA CHART PREP (OUTPATIENT)
Dept: FAMILY MEDICINE CLINIC | Facility: CLINIC | Age: 70
End: 2020-06-30

## 2020-07-10 ENCOUNTER — LAB ENCOUNTER (OUTPATIENT)
Dept: LAB | Age: 70
End: 2020-07-10
Attending: FAMILY MEDICINE
Payer: MEDICARE

## 2020-07-10 ENCOUNTER — OFFICE VISIT (OUTPATIENT)
Dept: FAMILY MEDICINE CLINIC | Facility: CLINIC | Age: 70
End: 2020-07-10
Payer: COMMERCIAL

## 2020-07-10 VITALS
WEIGHT: 210 LBS | HEIGHT: 68 IN | RESPIRATION RATE: 18 BRPM | BODY MASS INDEX: 31.83 KG/M2 | DIASTOLIC BLOOD PRESSURE: 70 MMHG | SYSTOLIC BLOOD PRESSURE: 122 MMHG | HEART RATE: 72 BPM | TEMPERATURE: 98 F

## 2020-07-10 DIAGNOSIS — E66.9 OBESITY, CLASS I, BMI 30-34.9: ICD-10-CM

## 2020-07-10 DIAGNOSIS — Z99.89 OBSTRUCTIVE SLEEP APNEA ON CPAP: ICD-10-CM

## 2020-07-10 DIAGNOSIS — R73.01 IMPAIRED FASTING GLUCOSE: ICD-10-CM

## 2020-07-10 DIAGNOSIS — F41.1 GENERALIZED ANXIETY DISORDER: ICD-10-CM

## 2020-07-10 DIAGNOSIS — Z00.00 ENCOUNTER FOR MEDICARE ANNUAL WELLNESS EXAM: Primary | ICD-10-CM

## 2020-07-10 DIAGNOSIS — Z12.5 PROSTATE CANCER SCREENING: ICD-10-CM

## 2020-07-10 DIAGNOSIS — G47.33 OBSTRUCTIVE SLEEP APNEA ON CPAP: ICD-10-CM

## 2020-07-10 DIAGNOSIS — Z12.5 PROSTATE CANCER SCREENING: Primary | ICD-10-CM

## 2020-07-10 DIAGNOSIS — E78.2 MIXED HYPERLIPIDEMIA: ICD-10-CM

## 2020-07-10 DIAGNOSIS — Z13.89 SCREENING FOR GENITOURINARY CONDITION: ICD-10-CM

## 2020-07-10 DIAGNOSIS — Z00.00 ENCOUNTER FOR MEDICARE ANNUAL WELLNESS EXAM: ICD-10-CM

## 2020-07-10 DIAGNOSIS — E78.2 MIXED HYPERLIPIDEMIA: Primary | ICD-10-CM

## 2020-07-10 DIAGNOSIS — I70.0 AORTIC CALCIFICATION (HCC): ICD-10-CM

## 2020-07-10 LAB
ALBUMIN SERPL-MCNC: 3.9 G/DL (ref 3.4–5)
ALBUMIN/GLOB SERPL: 1.2 {RATIO} (ref 1–2)
ALP LIVER SERPL-CCNC: 92 U/L (ref 45–117)
ALT SERPL-CCNC: 23 U/L (ref 16–61)
ANION GAP SERPL CALC-SCNC: 5 MMOL/L (ref 0–18)
AST SERPL-CCNC: 24 U/L (ref 15–37)
BASOPHILS # BLD AUTO: 0.02 X10(3) UL (ref 0–0.2)
BASOPHILS NFR BLD AUTO: 0.3 %
BILIRUB SERPL-MCNC: 0.6 MG/DL (ref 0.1–2)
BUN BLD-MCNC: 16 MG/DL (ref 7–18)
BUN/CREAT SERPL: 14.7 (ref 10–20)
CALCIUM BLD-MCNC: 8.7 MG/DL (ref 8.5–10.1)
CHLORIDE SERPL-SCNC: 108 MMOL/L (ref 98–112)
CHOLEST SMN-MCNC: 145 MG/DL (ref ?–200)
CO2 SERPL-SCNC: 27 MMOL/L (ref 21–32)
CREAT BLD-MCNC: 1.09 MG/DL (ref 0.7–1.3)
DEPRECATED RDW RBC AUTO: 41.1 FL (ref 35.1–46.3)
EOSINOPHIL # BLD AUTO: 0.09 X10(3) UL (ref 0–0.7)
EOSINOPHIL NFR BLD AUTO: 1.4 %
ERYTHROCYTE [DISTWIDTH] IN BLOOD BY AUTOMATED COUNT: 12.6 % (ref 11–15)
EST. AVERAGE GLUCOSE BLD GHB EST-MCNC: 123 MG/DL (ref 68–126)
GLOBULIN PLAS-MCNC: 3.3 G/DL (ref 2.8–4.4)
GLUCOSE BLD-MCNC: 114 MG/DL (ref 70–99)
HBA1C MFR BLD HPLC: 5.9 % (ref ?–5.7)
HCT VFR BLD AUTO: 45.9 % (ref 39–53)
HDLC SERPL-MCNC: 36 MG/DL (ref 40–59)
HGB BLD-MCNC: 15.3 G/DL (ref 13–17.5)
IMM GRANULOCYTES # BLD AUTO: 0.02 X10(3) UL (ref 0–1)
IMM GRANULOCYTES NFR BLD: 0.3 %
LDLC SERPL CALC-MCNC: 74 MG/DL (ref ?–100)
LYMPHOCYTES # BLD AUTO: 1.68 X10(3) UL (ref 1–4)
LYMPHOCYTES NFR BLD AUTO: 25.7 %
M PROTEIN MFR SERPL ELPH: 7.2 G/DL (ref 6.4–8.2)
MCH RBC QN AUTO: 29.9 PG (ref 26–34)
MCHC RBC AUTO-ENTMCNC: 33.3 G/DL (ref 31–37)
MCV RBC AUTO: 89.6 FL (ref 80–100)
MONOCYTES # BLD AUTO: 0.41 X10(3) UL (ref 0.1–1)
MONOCYTES NFR BLD AUTO: 6.3 %
NEUTROPHILS # BLD AUTO: 4.32 X10 (3) UL (ref 1.5–7.7)
NEUTROPHILS # BLD AUTO: 4.32 X10(3) UL (ref 1.5–7.7)
NEUTROPHILS NFR BLD AUTO: 66 %
NONHDLC SERPL-MCNC: 109 MG/DL (ref ?–130)
OSMOLALITY SERPL CALC.SUM OF ELEC: 292 MOSM/KG (ref 275–295)
PATIENT FASTING Y/N/NP: YES
PATIENT FASTING Y/N/NP: YES
PLATELET # BLD AUTO: 193 10(3)UL (ref 150–450)
POTASSIUM SERPL-SCNC: 4 MMOL/L (ref 3.5–5.1)
PSA SERPL-MCNC: 2.65 NG/ML (ref ?–4)
RBC # BLD AUTO: 5.12 X10(6)UL (ref 3.8–5.8)
SODIUM SERPL-SCNC: 140 MMOL/L (ref 136–145)
TRIGL SERPL-MCNC: 176 MG/DL (ref 30–149)
VLDLC SERPL CALC-MCNC: 35 MG/DL (ref 0–30)
WBC # BLD AUTO: 6.5 X10(3) UL (ref 4–11)

## 2020-07-10 PROCEDURE — 96160 PT-FOCUSED HLTH RISK ASSMT: CPT | Performed by: FAMILY MEDICINE

## 2020-07-10 PROCEDURE — 84153 ASSAY OF PSA TOTAL: CPT

## 2020-07-10 PROCEDURE — 99397 PER PM REEVAL EST PAT 65+ YR: CPT | Performed by: FAMILY MEDICINE

## 2020-07-10 PROCEDURE — G0439 PPPS, SUBSEQ VISIT: HCPCS | Performed by: FAMILY MEDICINE

## 2020-07-10 NOTE — PROGRESS NOTES
HPI:   Josue Bolden is a 77year old male who presents for a MA (Medicare Advantage) [facility] (Once per calendar year). Patient has no complaints or concerns today. Denies any recent illnesses or hospitalizations.   His tetanus vaccine is up-to-nolan infarction (1/12), Hypercholesterolemia, Laboratory test (1/6/12), Lipid screening (1/4/12), Routine general medical examination at a health care facility (12/29/2011), Stye (6/12), URI (upper respiratory infection) (2/29/12), and Vascular calcification (2 Rub Result:  Pass                  Visual Acuity                           General Appearance:  Alert, cooperative, no distress, appears stated age   Head:  Normocephalic, without obvious abnormality, atraumatic   Eyes:  PERRL, conjunctiva/corneas clear, E fasting lipid panel  -  Adjust lipitor accordingly  -  Encourage low fat/low carb intake    Aortic calcification (HCC)  -  Monitored by cardiology  -  Check ultrafast heart scan    Generalized anxiety disorder  -  Stable  -  Continue sertraline daily and x Hearing Problems?: (P) No    Vision Problems? : (P) No    Difficulty walking?: (P) No    Difficulty dressing or bathing?: (P) No    Problems with daily activities? : (P) No    Memory Problems?: (P) No      Fall/Risk Assessment     Do you have 3 or more 08/29/2014 104 (H)          Cardiovascular Disease Screening     LDL Annually LDL Cholesterol (mg/dL)   Date Value   07/10/2019 84     LDL-CHOLESTEROL (mg/dL (calc))   Date Value   08/29/2014 85        EKG - w/ Initial Preventative Physical Exam only, or Diabetes      HgbA1C  Annually HEMOGLOBIN A1c (% of total Hgb)   Date Value   08/29/2014 5.9 (H)     HgbA1C (%)   Date Value   07/10/2019 6.1 (H)       No flowsheet data found.     Creat/alb ratio  Annually      LDL  Annually LDL Cholesterol (mg/dL)   Reginald

## 2020-07-13 ENCOUNTER — PATIENT MESSAGE (OUTPATIENT)
Dept: FAMILY MEDICINE CLINIC | Facility: CLINIC | Age: 70
End: 2020-07-13

## 2020-07-13 DIAGNOSIS — R97.20 INCREASED PROSTATE SPECIFIC ANTIGEN (PSA) VELOCITY: Primary | ICD-10-CM

## 2020-09-10 DIAGNOSIS — E78.2 MIXED HYPERLIPIDEMIA: ICD-10-CM

## 2020-09-11 RX ORDER — ATORVASTATIN CALCIUM 20 MG/1
TABLET, FILM COATED ORAL
Qty: 90 TABLET | Refills: 0 | Status: SHIPPED | OUTPATIENT
Start: 2020-09-11 | End: 2020-12-04

## 2020-12-03 DIAGNOSIS — E78.2 MIXED HYPERLIPIDEMIA: ICD-10-CM

## 2020-12-04 RX ORDER — ATORVASTATIN CALCIUM 20 MG/1
TABLET, FILM COATED ORAL
Qty: 90 TABLET | Refills: 1 | Status: SHIPPED | OUTPATIENT
Start: 2020-12-04 | End: 2021-05-17

## 2021-04-27 ENCOUNTER — MED REC SCAN ONLY (OUTPATIENT)
Dept: FAMILY MEDICINE CLINIC | Facility: CLINIC | Age: 71
End: 2021-04-27

## 2021-05-14 DIAGNOSIS — E78.2 MIXED HYPERLIPIDEMIA: ICD-10-CM

## 2021-05-17 RX ORDER — ATORVASTATIN CALCIUM 20 MG/1
TABLET, FILM COATED ORAL
Qty: 90 TABLET | Refills: 0 | Status: SHIPPED | OUTPATIENT
Start: 2021-05-17 | End: 2021-07-22

## 2021-06-30 ENCOUNTER — OFFICE VISIT (OUTPATIENT)
Dept: FAMILY MEDICINE CLINIC | Facility: CLINIC | Age: 71
End: 2021-06-30
Payer: COMMERCIAL

## 2021-06-30 ENCOUNTER — LAB ENCOUNTER (OUTPATIENT)
Dept: LAB | Age: 71
End: 2021-06-30
Attending: FAMILY MEDICINE
Payer: MEDICARE

## 2021-06-30 VITALS
HEART RATE: 96 BPM | DIASTOLIC BLOOD PRESSURE: 76 MMHG | BODY MASS INDEX: 31.98 KG/M2 | RESPIRATION RATE: 18 BRPM | TEMPERATURE: 98 F | HEIGHT: 68 IN | SYSTOLIC BLOOD PRESSURE: 118 MMHG | WEIGHT: 211 LBS

## 2021-06-30 DIAGNOSIS — Z99.89 OBSTRUCTIVE SLEEP APNEA ON CPAP: ICD-10-CM

## 2021-06-30 DIAGNOSIS — Z12.5 PROSTATE CANCER SCREENING: ICD-10-CM

## 2021-06-30 DIAGNOSIS — E78.2 MIXED HYPERLIPIDEMIA: ICD-10-CM

## 2021-06-30 DIAGNOSIS — E66.9 OBESITY, CLASS I, BMI 30-34.9: ICD-10-CM

## 2021-06-30 DIAGNOSIS — F41.1 GENERALIZED ANXIETY DISORDER: ICD-10-CM

## 2021-06-30 DIAGNOSIS — G47.33 OBSTRUCTIVE SLEEP APNEA ON CPAP: ICD-10-CM

## 2021-06-30 DIAGNOSIS — I70.0 AORTIC CALCIFICATION (HCC): ICD-10-CM

## 2021-06-30 DIAGNOSIS — R73.01 IMPAIRED FASTING GLUCOSE: ICD-10-CM

## 2021-06-30 DIAGNOSIS — Z00.00 ENCOUNTER FOR MEDICARE ANNUAL WELLNESS EXAM: Primary | ICD-10-CM

## 2021-06-30 LAB — COMPLEXED PSA SERPL-MCNC: 2.81 NG/ML (ref ?–4)

## 2021-06-30 PROCEDURE — G0439 PPPS, SUBSEQ VISIT: HCPCS | Performed by: FAMILY MEDICINE

## 2021-06-30 PROCEDURE — 3008F BODY MASS INDEX DOCD: CPT | Performed by: FAMILY MEDICINE

## 2021-06-30 PROCEDURE — 3074F SYST BP LT 130 MM HG: CPT | Performed by: FAMILY MEDICINE

## 2021-06-30 PROCEDURE — 96160 PT-FOCUSED HLTH RISK ASSMT: CPT | Performed by: FAMILY MEDICINE

## 2021-06-30 PROCEDURE — 3078F DIAST BP <80 MM HG: CPT | Performed by: FAMILY MEDICINE

## 2021-06-30 PROCEDURE — 99397 PER PM REEVAL EST PAT 65+ YR: CPT | Performed by: FAMILY MEDICINE

## 2021-06-30 RX ORDER — ESCITALOPRAM OXALATE 20 MG/1
20 TABLET ORAL DAILY
Qty: 90 TABLET | Refills: 0 | COMMUNITY
Start: 2021-06-30 | End: 2021-12-08

## 2021-06-30 RX ORDER — ESCITALOPRAM OXALATE 10 MG/1
10 TABLET ORAL DAILY
COMMUNITY
Start: 2021-06-16 | End: 2021-06-30 | Stop reason: DRUGHIGH

## 2021-06-30 NOTE — PROGRESS NOTES
HPI:   Che Tellez is a 72year old male who presents for a MA (Medicare Advantage) [facility] (Once per calendar year). Patient has no complaints or concerns today. Denies any recent illnesses or hospitalizations.   His tetanus vaccine is up-to-nolan (1/24/12), Cardiac infarction (1/12), Hypercholesterolemia, Laboratory test (1/6/12), Lipid screening (1/4/12), Routine general medical examination at a health care facility (12/29/2011), Stye (6/12), URI (upper respiratory infection) (2/29/12), and Vascul AM   Screening Method:  Finger Rub   Finger Rub Result:  Pass                  Visual Acuity                           General Appearance:  Alert, cooperative, no distress, appears stated age   Head:  Normocephalic, without obvious abnormality, atraumatic discuss treatment options    Obesity, Class I, BMI 30-34.9  -  Encourage low fat/low carb diet and exercise    Mixed hyperlipidemia  -  Recheck fasting lipid panel  -  Adjust lipitor accordingly  -  Encourage low fat/low carb intake    Aortic calcification help    Managing money/bills: Able without help    Taking medications as prescribed: Able without help    Are you able to afford your medications?: Yes    Hearing Problems?: No     Functional Status     Hearing Problems?: No    Vision Problems? : No    Dif Cardiovascular Disease Screening     LDL Annually LDL Cholesterol (mg/dL)   Date Value   07/10/2020 74     LDL-CHOLESTEROL (mg/dL (calc))   Date Value   08/29/2014 85        EKG - w/ Initial Preventative Physical Exam only, or if medically necessary Elec Annually HEMOGLOBIN A1c (% of total Hgb)   Date Value   08/29/2014 5.9 (H)     HgbA1C (%)   Date Value   07/10/2020 5.9 (H)       No flowsheet data found.     Creat/alb ratio  Annually      LDL  Annually LDL Cholesterol (mg/dL)   Date Value   07/10/2020 74

## 2021-06-30 NOTE — PATIENT INSTRUCTIONS
Prevention Guidelines, Men Ages 72 and Older  Screening tests and vaccines are an important part of managing your health. A screening test is done to find possible disorders or diseases in people who don't have any symptoms.  The goal is to find a disease recommendations vary among expert groups. Talk with your healthcare provider about which tests are best for you. Some men should be screened using a different schedule because of their personal or family history.  Talk with your healthcare provider about y (varicella) All men in this age group who have no record of this infection or vaccine 2 doses; second dose should be given at least 4 weeks after the first dose   Hepatitis A Men at increased risk for infection 2 or 3 doses (depending on vaccine) given at infection Men at increased risk for infection At yearly routine exams   Use of daily aspirin Men up to age 79 who are at high risk for cardiovascular problems and not at an increased risk of bleeding as identified by your healthcare provider When your risk

## 2021-07-20 DIAGNOSIS — E78.2 MIXED HYPERLIPIDEMIA: ICD-10-CM

## 2021-07-22 RX ORDER — ATORVASTATIN CALCIUM 20 MG/1
TABLET, FILM COATED ORAL
Qty: 90 TABLET | Refills: 1 | Status: SHIPPED | OUTPATIENT
Start: 2021-07-22 | End: 2022-01-21

## 2021-10-13 ENCOUNTER — TELEPHONE (OUTPATIENT)
Dept: FAMILY MEDICINE CLINIC | Facility: CLINIC | Age: 71
End: 2021-10-13

## 2021-10-13 RX ORDER — ALPRAZOLAM 0.25 MG/1
0.25 TABLET ORAL 2 TIMES DAILY PRN
Qty: 60 TABLET | Refills: 1 | Status: SHIPPED | OUTPATIENT
Start: 2021-10-13 | End: 2021-12-08

## 2021-10-13 NOTE — TELEPHONE ENCOUNTER
LOV: 21  for: yearly physical  Patient advised to RTC on: none stated  Previous Rx: Alprazolam 0.25mg à Si tablet nightly as needed, previous script stated pt was taking 1 tablet in AM and 1 tablet in PM. Disp #90 /0 refills.   LF: 2017

## 2021-12-08 DIAGNOSIS — F41.1 GENERALIZED ANXIETY DISORDER: ICD-10-CM

## 2021-12-08 RX ORDER — ESCITALOPRAM OXALATE 20 MG/1
20 TABLET ORAL DAILY
Qty: 90 TABLET | Refills: 0 | Status: SHIPPED | OUTPATIENT
Start: 2021-12-08

## 2021-12-08 RX ORDER — ALPRAZOLAM 0.25 MG/1
0.25 TABLET ORAL 2 TIMES DAILY PRN
Qty: 60 TABLET | Refills: 1 | Status: SHIPPED | OUTPATIENT
Start: 2021-12-08

## 2021-12-08 NOTE — TELEPHONE ENCOUNTER
LOV: 6/30/21  for: TEENA  Patient advised to RTC on:  None stated  Previous Rx:  Alprazolam 0.25mgà Sig: Take 1 tablet by mouth 2 times daily as needed for anxiety. Disp #60/1 refills.   LF: 10/13/21    Previous Rx:  Escitalopram 20mgà Sig: Take 1 tablet by m

## 2021-12-09 DIAGNOSIS — F41.1 GENERALIZED ANXIETY DISORDER: ICD-10-CM

## 2021-12-09 RX ORDER — ESCITALOPRAM OXALATE 20 MG/1
TABLET ORAL
Qty: 30 TABLET | Refills: 0 | OUTPATIENT
Start: 2021-12-09

## 2021-12-09 NOTE — TELEPHONE ENCOUNTER
LOV: 6-30-21  for: TEENA  Patient advised to RTC on:  None stated  Previous Rx:  Escitalopram 20mgà Sig: Take 1 tablet by mouth daily. Disp #90/0 refills.   LF: 12/8/21

## 2022-01-20 DIAGNOSIS — E78.2 MIXED HYPERLIPIDEMIA: ICD-10-CM

## 2022-01-21 RX ORDER — ATORVASTATIN CALCIUM 20 MG/1
TABLET, FILM COATED ORAL
Qty: 90 TABLET | Refills: 0 | Status: SHIPPED | OUTPATIENT
Start: 2022-01-21

## 2022-01-21 NOTE — TELEPHONE ENCOUNTER
LOV: 6/30/21  for: MAW  Patient advised to RTC on:  None stated  ATORVASTATIN 20 MG Oral Tab 90 tablet 1 7/22/2021    Sig:   TAKE 1 TABLET BY MOUTH AT [facility] sent to make a follow up appt.

## 2022-02-09 RX ORDER — ESCITALOPRAM OXALATE 20 MG/1
TABLET ORAL
Qty: 90 TABLET | Refills: 0 | Status: SHIPPED | OUTPATIENT
Start: 2022-02-09

## 2022-02-14 ENCOUNTER — OFFICE VISIT (OUTPATIENT)
Dept: FAMILY MEDICINE CLINIC | Facility: CLINIC | Age: 72
End: 2022-02-14
Payer: COMMERCIAL

## 2022-02-14 VITALS
OXYGEN SATURATION: 99 % | WEIGHT: 220 LBS | DIASTOLIC BLOOD PRESSURE: 80 MMHG | HEIGHT: 68 IN | TEMPERATURE: 98 F | BODY MASS INDEX: 33.34 KG/M2 | RESPIRATION RATE: 16 BRPM | SYSTOLIC BLOOD PRESSURE: 124 MMHG | HEART RATE: 81 BPM

## 2022-02-14 DIAGNOSIS — E78.2 MIXED HYPERLIPIDEMIA: Primary | ICD-10-CM

## 2022-02-14 DIAGNOSIS — F41.1 GENERALIZED ANXIETY DISORDER: ICD-10-CM

## 2022-02-14 DIAGNOSIS — R73.01 IMPAIRED FASTING GLUCOSE: ICD-10-CM

## 2022-02-14 PROCEDURE — 99214 OFFICE O/P EST MOD 30 MIN: CPT | Performed by: FAMILY MEDICINE

## 2022-02-14 PROCEDURE — 3079F DIAST BP 80-89 MM HG: CPT | Performed by: FAMILY MEDICINE

## 2022-02-14 PROCEDURE — 3008F BODY MASS INDEX DOCD: CPT | Performed by: FAMILY MEDICINE

## 2022-02-14 PROCEDURE — 3074F SYST BP LT 130 MM HG: CPT | Performed by: FAMILY MEDICINE

## 2022-02-14 RX ORDER — ALPRAZOLAM 0.25 MG/1
0.25 TABLET ORAL 2 TIMES DAILY PRN
Qty: 60 TABLET | Refills: 2 | Status: SHIPPED | OUTPATIENT
Start: 2022-02-14

## 2022-02-23 LAB
ALBUMIN/GLOBULIN RATIO: 1.7 (CALC) (ref 1–2.5)
ALBUMIN: 4.3 G/DL (ref 3.6–5.1)
ALKALINE PHOSPHATASE: 90 U/L (ref 35–144)
ALT: 17 U/L (ref 9–46)
AST: 16 U/L (ref 10–35)
BILIRUBIN, TOTAL: 0.8 MG/DL (ref 0.2–1.2)
BUN: 17 MG/DL (ref 7–25)
CALCIUM: 9.4 MG/DL (ref 8.6–10.3)
CARBON DIOXIDE: 30 MMOL/L (ref 20–32)
CHLORIDE: 104 MMOL/L (ref 98–110)
CHOL/HDLC RATIO: 4.2 (CALC)
CHOLESTEROL, TOTAL: 152 MG/DL
CREATININE: 1.1 MG/DL (ref 0.7–1.18)
EGFR IF AFRICN AM: 78 ML/MIN/1.73M2
EGFR IF NONAFRICN AM: 68 ML/MIN/1.73M2
GLOBULIN: 2.6 G/DL (CALC) (ref 1.9–3.7)
GLUCOSE: 104 MG/DL (ref 65–99)
HDL CHOLESTEROL: 36 MG/DL
HEMOGLOBIN A1C: 5.7 % OF TOTAL HGB
LDL-CHOLESTEROL: 83 MG/DL (CALC)
NON-HDL CHOLESTEROL: 116 MG/DL (CALC)
POTASSIUM: 4.3 MMOL/L (ref 3.5–5.3)
PROTEIN, TOTAL: 6.9 G/DL (ref 6.1–8.1)
PSA, TOTAL: 2.32 NG/ML
SODIUM: 140 MMOL/L (ref 135–146)
TRIGLYCERIDES: 237 MG/DL

## 2022-03-02 ENCOUNTER — ORDER TRANSCRIPTION (OUTPATIENT)
Dept: ADMINISTRATIVE | Facility: HOSPITAL | Age: 72
End: 2022-03-02

## 2022-04-12 DIAGNOSIS — E78.2 MIXED HYPERLIPIDEMIA: ICD-10-CM

## 2022-04-13 RX ORDER — ATORVASTATIN CALCIUM 20 MG/1
20 TABLET, FILM COATED ORAL NIGHTLY
Qty: 90 TABLET | Refills: 1 | Status: SHIPPED | OUTPATIENT
Start: 2022-04-13 | End: 2022-05-09

## 2022-04-14 ENCOUNTER — TELEPHONE (OUTPATIENT)
Dept: FAMILY MEDICINE CLINIC | Facility: CLINIC | Age: 72
End: 2022-04-14

## 2022-04-15 ENCOUNTER — TELEPHONE (OUTPATIENT)
Dept: FAMILY MEDICINE CLINIC | Facility: CLINIC | Age: 72
End: 2022-04-15

## 2022-04-15 NOTE — TELEPHONE ENCOUNTER
I reviewed the chart. Pt just had labs on 02/21/2022. I do see an order for a Diagnostic PSA in the chart but no date when to do that. I spoke to Dr. Red Giordano. He will let pt know at the visit in July if he needs to do this then. Pt verbalized understanding.

## 2022-04-15 NOTE — TELEPHONE ENCOUNTER
Pt scheduled MAW for 7/11 and wondering if he requires lab work. Pt states he completed lab work on 2/21/22 and wants to make sure insurance wont charge him for completing any additional labs?      Please advise

## 2022-04-18 ENCOUNTER — MED REC SCAN ONLY (OUTPATIENT)
Dept: FAMILY MEDICINE CLINIC | Facility: CLINIC | Age: 72
End: 2022-04-18

## 2022-05-02 ENCOUNTER — HOSPITAL ENCOUNTER (OUTPATIENT)
Dept: CT IMAGING | Facility: HOSPITAL | Age: 72
Discharge: HOME | End: 2022-05-02
Attending: FAMILY MEDICINE

## 2022-05-02 ENCOUNTER — ORDER TRANSCRIPTION (OUTPATIENT)
Dept: ADMINISTRATIVE | Facility: HOSPITAL | Age: 72
End: 2022-05-02

## 2022-05-02 DIAGNOSIS — Z13.6 SCREENING FOR CARDIOVASCULAR CONDITION: ICD-10-CM

## 2022-05-03 ENCOUNTER — PATIENT MESSAGE (OUTPATIENT)
Dept: FAMILY MEDICINE CLINIC | Facility: CLINIC | Age: 72
End: 2022-05-03

## 2022-05-03 ENCOUNTER — TELEPHONE (OUTPATIENT)
Dept: FAMILY MEDICINE CLINIC | Facility: CLINIC | Age: 72
End: 2022-05-03

## 2022-05-03 NOTE — TELEPHONE ENCOUNTER
Order pended for CT  See prompts when I try to order. Is for enlarged lymph nodes, are we trying to r/o cancer? Please review/sign, thanks.

## 2022-05-04 ENCOUNTER — TELEPHONE (OUTPATIENT)
Dept: FAMILY MEDICINE CLINIC | Facility: CLINIC | Age: 72
End: 2022-05-04

## 2022-05-04 NOTE — TELEPHONE ENCOUNTER
Pt called to discuss test results received from imaging done on 5/2     Pt states after reviewing the results he received he is concerned, and would like tot talk to a triage nurse to discuss the next steps he should take regarding these results. Pt requesting call back from triage nurse. Please advise .

## 2022-05-06 NOTE — TELEPHONE ENCOUNTER
From: Jessica Fonseca  To: [facility], DO  Sent: 5/3/2022 4:25 PM CDT  Subject: Heart Scan test results    Dr Doug Paz, I received the notice to set up appt for the CT with contrast. What does this mean, is this something I need to be worried about? mild mediastinal lymphadenopathy is likely present. This is somewhat incompletely assessed.     Thank you  Gisselle Garza  [phone]

## 2022-05-06 NOTE — TELEPHONE ENCOUNTER
Several lymph nodes noted on CT chest from heart scan that he is asking about. Radiologist recommends CT of the chest with contrast to further delineate the nature of these lymph nodes.

## 2022-05-09 RX ORDER — ATORVASTATIN CALCIUM 40 MG/1
40 TABLET, FILM COATED ORAL NIGHTLY
Qty: 90 TABLET | Refills: 1 | Status: SHIPPED | OUTPATIENT
Start: 2022-05-09

## 2022-05-09 NOTE — TELEPHONE ENCOUNTER
Call to [facility] pt has appt scheduled w dr Robles Parents 5/16/22.    Referral order placed w request [facility] referrals to expedite due to above appt per dr ram's recommendation to see cardiology asap

## 2022-05-09 NOTE — TELEPHONE ENCOUNTER
[id number] call from pt-placed call on speaker w jasen/spouse-pt requests we go over [facility] results again w jasen. Lupe Jose is listed as emergency contact only. Discussed may want to complete new hipaa consent if he wants us to be able to provide jasen w his medical info in the future. Pt voices understanding. Reviewed dr ram's comments and recommendations re both cardioogy and radiology readings re [facility]. Explained lymphadenpathy means enlarged lymp nodes and explained rationale for CT chest and cardiology eval..   Provided location and contact info for dr Gmóez Morgan. Advised to confirm w insurance that dr Gómez Morgan and his group partners are covered by his ins plan, then schedule appt in the next 7--10 days. If any difficulty scheduling appt in that timeframe, call back to any triage nurse in our ofc to help expedite appt. Pt and spouse voice understanding, agree w plan, no other questions. Advised will place referral order as soon as I know which provider pt has scheduled with.

## 2022-05-09 NOTE — TELEPHONE ENCOUNTER
Received messg this afternoon from [address] [place] [address],B-[facility] referrals stating dr macario/cardiology is coming back as out of network w pt's ins plan. Confirms dr Oumar Lucero is in network, has completed referral under dr Oumar Lucero for now. Discussed dr Oumar Lucero can not get pt in as soon as dr ram recommending. Advised have call in to [employer] all back. Will update asa. [phone] call from [facility] practice administrator. Advised pt info and input from [facility] referrals. [facility] all their providers are credentialed, just some insurances slower to update than others. Provided pt's ins info. [facility] dr Deborah De Anda and dr Sawyer Gonzalez have appts available tomorrow. sts she will reach out to pt and his spouse to explain above info and offer appt tomorrow. Requests we update referral order to reflect dr Deborah De Anda for now. She will update me tomorrow w pt appt info. Call to [facility]ose[facility] referrals-gone for the day. Messaged jose mane above info. Will follow up on this tomorrow.

## 2022-05-13 ENCOUNTER — TELEPHONE (OUTPATIENT)
Dept: ADMINISTRATIVE | Age: 72
End: 2022-05-13

## 2022-05-13 NOTE — TELEPHONE ENCOUNTER
Patient requested a new referral to see Cardiology Ramone Jim referral for Specialist Army Mabry is not available until October 2022,Please review and sign plan and care if you agree Thank you. Miriam Rogers V      [facility] REFERRALS.

## 2022-05-16 RX ORDER — ESCITALOPRAM OXALATE 20 MG/1
TABLET ORAL
Qty: 90 TABLET | Refills: 0 | Status: SHIPPED | OUTPATIENT
Start: 2022-05-16

## 2022-05-26 NOTE — PROGRESS NOTES
Patient completed the Heart Scan on 5/2/22. In addition to his calcium score of 432.97 he has a Cardiac Incidental Finding of Dilated Thoracic Aorta 3.9 cm. Dr. Sreedhar David has a Chest CT ordered for an incidental on the over read of mediastinal lymphadenopathy. Epic message sent to Dr. Sreedhar David about the Cardiac Incidental Finding just to make sure that he is aware.

## 2022-05-31 ENCOUNTER — HOSPITAL ENCOUNTER (OUTPATIENT)
Dept: CT IMAGING | Facility: HOSPITAL | Age: 72
Discharge: HOME | End: 2022-05-31
Attending: FAMILY MEDICINE
Payer: MEDICARE

## 2022-05-31 DIAGNOSIS — R59.0 MEDIASTINAL LYMPHADENOPATHY: ICD-10-CM

## 2022-05-31 PROCEDURE — 82565 ASSAY OF CREATININE: CPT

## 2022-05-31 PROCEDURE — 71260 CT THORAX DX C+: CPT | Performed by: FAMILY MEDICINE

## 2022-06-01 LAB — CREAT BLD-MCNC: 1 MG/DL

## 2022-07-11 ENCOUNTER — LAB ENCOUNTER (OUTPATIENT)
Dept: LAB | Age: 72
End: 2022-07-11
Attending: FAMILY MEDICINE
Payer: MEDICARE

## 2022-07-11 ENCOUNTER — OFFICE VISIT (OUTPATIENT)
Dept: FAMILY MEDICINE CLINIC | Facility: CLINIC | Age: 72
End: 2022-07-11
Payer: COMMERCIAL

## 2022-07-11 VITALS
HEIGHT: 68 IN | DIASTOLIC BLOOD PRESSURE: 78 MMHG | TEMPERATURE: 97 F | WEIGHT: 218 LBS | HEART RATE: 72 BPM | BODY MASS INDEX: 33.04 KG/M2 | SYSTOLIC BLOOD PRESSURE: 134 MMHG | RESPIRATION RATE: 16 BRPM

## 2022-07-11 DIAGNOSIS — R73.01 IMPAIRED FASTING GLUCOSE: ICD-10-CM

## 2022-07-11 DIAGNOSIS — F41.1 GENERALIZED ANXIETY DISORDER: ICD-10-CM

## 2022-07-11 DIAGNOSIS — I70.0 ATHEROSCLEROSIS OF AORTA (HCC): Primary | ICD-10-CM

## 2022-07-11 DIAGNOSIS — E78.2 MIXED HYPERLIPIDEMIA: ICD-10-CM

## 2022-07-11 DIAGNOSIS — G47.33 OBSTRUCTIVE SLEEP APNEA ON CPAP: ICD-10-CM

## 2022-07-11 DIAGNOSIS — I70.0 AORTIC CALCIFICATION (HCC): ICD-10-CM

## 2022-07-11 DIAGNOSIS — R93.1 ABNORMAL ECHOCARDIOGRAM: ICD-10-CM

## 2022-07-11 DIAGNOSIS — R93.1 ELEVATED CORONARY ARTERY CALCIUM SCORE: ICD-10-CM

## 2022-07-11 DIAGNOSIS — Z99.89 OBSTRUCTIVE SLEEP APNEA ON CPAP: ICD-10-CM

## 2022-07-11 DIAGNOSIS — E66.9 OBESITY, CLASS I, BMI 30-34.9: ICD-10-CM

## 2022-07-11 DIAGNOSIS — Z00.00 ENCOUNTER FOR MEDICARE ANNUAL WELLNESS EXAM: Primary | ICD-10-CM

## 2022-07-11 PROBLEM — Z12.11 ENCOUNTER FOR SCREENING COLONOSCOPY: Status: RESOLVED | Noted: 2018-07-30 | Resolved: 2022-07-11

## 2022-07-11 LAB
ALBUMIN SERPL-MCNC: 3.8 G/DL (ref 3.4–5)
ALBUMIN/GLOB SERPL: 1.1 {RATIO} (ref 1–2)
ALP LIVER SERPL-CCNC: 100 U/L
ALT SERPL-CCNC: 28 U/L
ANION GAP SERPL CALC-SCNC: 5 MMOL/L (ref 0–18)
AST SERPL-CCNC: 17 U/L (ref 15–37)
BASOPHILS # BLD AUTO: 0.02 X10(3) UL (ref 0–0.2)
BASOPHILS NFR BLD AUTO: 0.3 %
BILIRUB SERPL-MCNC: 0.7 MG/DL (ref 0.1–2)
BUN BLD-MCNC: 17 MG/DL (ref 7–18)
CALCIUM BLD-MCNC: 9.3 MG/DL (ref 8.5–10.1)
CHLORIDE SERPL-SCNC: 107 MMOL/L (ref 98–112)
CHOLEST SERPL-MCNC: 119 MG/DL (ref ?–200)
CO2 SERPL-SCNC: 29 MMOL/L (ref 21–32)
CREAT BLD-MCNC: 1.12 MG/DL
EOSINOPHIL # BLD AUTO: 0.09 X10(3) UL (ref 0–0.7)
EOSINOPHIL NFR BLD AUTO: 1.2 %
ERYTHROCYTE [DISTWIDTH] IN BLOOD BY AUTOMATED COUNT: 12.6 %
EST. AVERAGE GLUCOSE BLD GHB EST-MCNC: 123 MG/DL (ref 68–126)
FASTING PATIENT LIPID ANSWER: YES
FASTING STATUS PATIENT QL REPORTED: YES
GLOBULIN PLAS-MCNC: 3.4 G/DL (ref 2.8–4.4)
GLUCOSE BLD-MCNC: 96 MG/DL (ref 70–99)
HBA1C MFR BLD: 5.9 % (ref ?–5.7)
HCT VFR BLD AUTO: 46.3 %
HDLC SERPL-MCNC: 37 MG/DL (ref 40–59)
HGB BLD-MCNC: 15.3 G/DL
IMM GRANULOCYTES # BLD AUTO: 0.01 X10(3) UL (ref 0–1)
IMM GRANULOCYTES NFR BLD: 0.1 %
LDLC SERPL CALC-MCNC: 51 MG/DL (ref ?–100)
LYMPHOCYTES # BLD AUTO: 2.08 X10(3) UL (ref 1–4)
LYMPHOCYTES NFR BLD AUTO: 27.4 %
MCH RBC QN AUTO: 30.4 PG (ref 26–34)
MCHC RBC AUTO-ENTMCNC: 33 G/DL (ref 31–37)
MCV RBC AUTO: 92 FL
MONOCYTES # BLD AUTO: 0.58 X10(3) UL (ref 0.1–1)
MONOCYTES NFR BLD AUTO: 7.6 %
NEUTROPHILS # BLD AUTO: 4.82 X10 (3) UL (ref 1.5–7.7)
NEUTROPHILS # BLD AUTO: 4.82 X10(3) UL (ref 1.5–7.7)
NEUTROPHILS NFR BLD AUTO: 63.4 %
NONHDLC SERPL-MCNC: 82 MG/DL (ref ?–130)
OSMOLALITY SERPL CALC.SUM OF ELEC: 293 MOSM/KG (ref 275–295)
PLATELET # BLD AUTO: 177 10(3)UL (ref 150–450)
POTASSIUM SERPL-SCNC: 4.5 MMOL/L (ref 3.5–5.1)
PROT SERPL-MCNC: 7.2 G/DL (ref 6.4–8.2)
RBC # BLD AUTO: 5.03 X10(6)UL
SODIUM SERPL-SCNC: 141 MMOL/L (ref 136–145)
TRIGL SERPL-MCNC: 186 MG/DL (ref 30–149)
VLDLC SERPL CALC-MCNC: 27 MG/DL (ref 0–30)
WBC # BLD AUTO: 7.6 X10(3) UL (ref 4–11)

## 2022-07-11 PROCEDURE — 96160 PT-FOCUSED HLTH RISK ASSMT: CPT | Performed by: FAMILY MEDICINE

## 2022-07-11 PROCEDURE — 85025 COMPLETE CBC W/AUTO DIFF WBC: CPT | Performed by: FAMILY MEDICINE

## 2022-07-11 PROCEDURE — 83036 HEMOGLOBIN GLYCOSYLATED A1C: CPT | Performed by: FAMILY MEDICINE

## 2022-07-11 PROCEDURE — 80053 COMPREHEN METABOLIC PANEL: CPT | Performed by: FAMILY MEDICINE

## 2022-07-11 PROCEDURE — 80061 LIPID PANEL: CPT

## 2022-07-11 PROCEDURE — G0439 PPPS, SUBSEQ VISIT: HCPCS | Performed by: FAMILY MEDICINE

## 2022-07-11 PROCEDURE — 99397 PER PM REEVAL EST PAT 65+ YR: CPT | Performed by: FAMILY MEDICINE

## 2022-07-11 PROCEDURE — 3075F SYST BP GE 130 - 139MM HG: CPT | Performed by: FAMILY MEDICINE

## 2022-07-11 PROCEDURE — 3078F DIAST BP <80 MM HG: CPT | Performed by: FAMILY MEDICINE

## 2022-07-11 PROCEDURE — 1126F AMNT PAIN NOTED NONE PRSNT: CPT | Performed by: FAMILY MEDICINE

## 2022-07-11 PROCEDURE — 3008F BODY MASS INDEX DOCD: CPT | Performed by: FAMILY MEDICINE

## 2022-08-02 ENCOUNTER — OFFICE VISIT (OUTPATIENT)
Dept: SLEEP CENTER | Age: 72
End: 2022-08-02
Attending: INTERNAL MEDICINE
Payer: MEDICARE

## 2022-08-02 DIAGNOSIS — R06.83 SNORING: ICD-10-CM

## 2022-08-02 DIAGNOSIS — R06.81 APNEA: ICD-10-CM

## 2022-08-02 DIAGNOSIS — G47.10 HYPERSOMNIA: ICD-10-CM

## 2022-08-02 PROCEDURE — 95810 POLYSOM 6/> YRS 4/> PARAM: CPT

## 2022-08-16 DIAGNOSIS — F41.1 GENERALIZED ANXIETY DISORDER: ICD-10-CM

## 2022-08-17 RX ORDER — ALPRAZOLAM 0.25 MG/1
0.25 TABLET ORAL NIGHTLY PRN
Qty: 30 TABLET | Refills: 2 | Status: SHIPPED | OUTPATIENT
Start: 2022-08-17

## 2022-08-25 DIAGNOSIS — F41.1 GENERALIZED ANXIETY DISORDER: ICD-10-CM

## 2022-08-25 RX ORDER — ESCITALOPRAM OXALATE 20 MG/1
TABLET ORAL
Qty: 90 TABLET | Refills: 1 | Status: SHIPPED | OUTPATIENT
Start: 2022-08-25

## 2022-09-21 DIAGNOSIS — E78.2 MIXED HYPERLIPIDEMIA: ICD-10-CM

## 2022-09-22 RX ORDER — ATORVASTATIN CALCIUM 40 MG/1
TABLET, FILM COATED ORAL
Qty: 90 TABLET | Refills: 1 | Status: SHIPPED | OUTPATIENT
Start: 2022-09-22

## 2022-11-05 ENCOUNTER — MED REC SCAN ONLY (OUTPATIENT)
Dept: FAMILY MEDICINE CLINIC | Facility: CLINIC | Age: 72
End: 2022-11-05

## 2022-12-07 DIAGNOSIS — F41.1 GENERALIZED ANXIETY DISORDER: ICD-10-CM

## 2022-12-08 RX ORDER — ALPRAZOLAM 0.25 MG/1
TABLET ORAL
Qty: 30 TABLET | Refills: 0 | Status: SHIPPED | OUTPATIENT
Start: 2022-12-08

## 2022-12-08 NOTE — TELEPHONE ENCOUNTER
LOV: 7/11/22 (TEENA)   Last Refill:  8/17/22, #30, 2 RF  Next OV: n/a    Please authorize if acceptable. Thank you!

## 2023-01-06 DIAGNOSIS — F41.1 GENERALIZED ANXIETY DISORDER: ICD-10-CM

## 2023-01-07 RX ORDER — ALPRAZOLAM 0.25 MG/1
TABLET ORAL
Qty: 30 TABLET | Refills: 0 | Status: SHIPPED | OUTPATIENT
Start: 2023-01-07

## 2023-02-04 DIAGNOSIS — F41.1 GENERALIZED ANXIETY DISORDER: ICD-10-CM

## 2023-02-06 RX ORDER — ALPRAZOLAM 0.25 MG/1
TABLET ORAL
Qty: 30 TABLET | Refills: 0 | Status: SHIPPED | OUTPATIENT
Start: 2023-02-06

## 2023-02-08 DIAGNOSIS — F41.1 GENERALIZED ANXIETY DISORDER: ICD-10-CM

## 2023-02-08 RX ORDER — ESCITALOPRAM OXALATE 20 MG/1
TABLET ORAL
Qty: 90 TABLET | Refills: 0 | Status: SHIPPED | OUTPATIENT
Start: 2023-02-08

## 2023-02-17 ENCOUNTER — OFFICE VISIT (OUTPATIENT)
Dept: FAMILY MEDICINE CLINIC | Facility: CLINIC | Age: 73
End: 2023-02-17
Payer: COMMERCIAL

## 2023-02-17 VITALS
SYSTOLIC BLOOD PRESSURE: 110 MMHG | RESPIRATION RATE: 14 BRPM | BODY MASS INDEX: 32.43 KG/M2 | TEMPERATURE: 97 F | HEART RATE: 96 BPM | HEIGHT: 68 IN | DIASTOLIC BLOOD PRESSURE: 72 MMHG | WEIGHT: 214 LBS

## 2023-02-17 DIAGNOSIS — Z99.89 OBSTRUCTIVE SLEEP APNEA ON CPAP: ICD-10-CM

## 2023-02-17 DIAGNOSIS — Z12.5 PROSTATE CANCER SCREENING: ICD-10-CM

## 2023-02-17 DIAGNOSIS — I70.0 ATHEROSCLEROSIS OF AORTA (HCC): ICD-10-CM

## 2023-02-17 DIAGNOSIS — R93.1 ELEVATED CORONARY ARTERY CALCIUM SCORE: ICD-10-CM

## 2023-02-17 DIAGNOSIS — F41.1 GENERALIZED ANXIETY DISORDER: ICD-10-CM

## 2023-02-17 DIAGNOSIS — E66.9 OBESITY, CLASS I, BMI 30-34.9: ICD-10-CM

## 2023-02-17 DIAGNOSIS — G47.33 OBSTRUCTIVE SLEEP APNEA ON CPAP: ICD-10-CM

## 2023-02-17 DIAGNOSIS — E78.2 MIXED HYPERLIPIDEMIA: ICD-10-CM

## 2023-02-17 DIAGNOSIS — Z00.00 ENCOUNTER FOR MEDICARE ANNUAL WELLNESS EXAM: Primary | ICD-10-CM

## 2023-02-17 DIAGNOSIS — R73.01 IMPAIRED FASTING GLUCOSE: ICD-10-CM

## 2023-02-17 RX ORDER — ESCITALOPRAM OXALATE 20 MG/1
20 TABLET ORAL DAILY
Qty: 90 TABLET | Refills: 1 | Status: SHIPPED | OUTPATIENT
Start: 2023-02-17

## 2023-04-20 LAB
ABSOLUTE BASOPHILS: 23 CELLS/UL (ref 0–200)
ABSOLUTE EOSINOPHILS: 108 CELLS/UL (ref 15–500)
ABSOLUTE LYMPHOCYTES: 2279 CELLS/UL (ref 850–3900)
ABSOLUTE MONOCYTES: 655 CELLS/UL (ref 200–950)
ABSOLUTE NEUTROPHILS: 4635 CELLS/UL (ref 1500–7800)
ALBUMIN/GLOBULIN RATIO: 1.7 (CALC) (ref 1–2.5)
ALBUMIN: 4.1 G/DL (ref 3.6–5.1)
ALKALINE PHOSPHATASE: 85 U/L (ref 35–144)
ALT: 20 U/L (ref 9–46)
AST: 19 U/L (ref 10–35)
BASOPHILS: 0.3 %
BILIRUBIN, TOTAL: 1 MG/DL (ref 0.2–1.2)
BUN: 17 MG/DL (ref 7–25)
CALCIUM: 9.1 MG/DL (ref 8.6–10.3)
CARBON DIOXIDE: 28 MMOL/L (ref 20–32)
CHLORIDE: 106 MMOL/L (ref 98–110)
CHOL/HDLC RATIO: 3.6 (CALC)
CHOLESTEROL, TOTAL: 130 MG/DL
CREATININE: 1.08 MG/DL (ref 0.7–1.28)
EGFR: 73 ML/MIN/1.73M2
EOSINOPHILS: 1.4 %
GLOBULIN: 2.4 G/DL (CALC) (ref 1.9–3.7)
GLUCOSE: 99 MG/DL (ref 65–99)
HDL CHOLESTEROL: 36 MG/DL
HEMATOCRIT: 45.4 % (ref 38.5–50)
HEMOGLOBIN A1C: 5.7 % OF TOTAL HGB
HEMOGLOBIN: 15.1 G/DL (ref 13.2–17.1)
LDL-CHOLESTEROL: 71 MG/DL (CALC)
LYMPHOCYTES: 29.6 %
MCH: 30.6 PG (ref 27–33)
MCHC: 33.3 G/DL (ref 32–36)
MCV: 92.1 FL (ref 80–100)
MONOCYTES: 8.5 %
MPV: 9.5 FL (ref 7.5–12.5)
NEUTROPHILS: 60.2 %
NON-HDL CHOLESTEROL: 94 MG/DL (CALC)
PLATELET COUNT: 181 THOUSAND/UL (ref 140–400)
POTASSIUM: 4.4 MMOL/L (ref 3.5–5.3)
PROTEIN, TOTAL: 6.5 G/DL (ref 6.1–8.1)
RDW: 13 % (ref 11–15)
RED BLOOD CELL COUNT: 4.93 MILLION/UL (ref 4.2–5.8)
SODIUM: 141 MMOL/L (ref 135–146)
TRIGLYCERIDES: 144 MG/DL
WHITE BLOOD CELL COUNT: 7.7 THOUSAND/UL (ref 3.8–10.8)

## 2023-04-24 ENCOUNTER — PATIENT MESSAGE (OUTPATIENT)
Dept: FAMILY MEDICINE CLINIC | Facility: CLINIC | Age: 73
End: 2023-04-24

## 2023-04-24 NOTE — TELEPHONE ENCOUNTER
From: Flower Nolasco  To: [facility],   Sent: 4/24/2023 10:29 AM CDT  Subject: PSA #s    Dr Rojelio Fleischer, on the recent blood work I completed last week, I did not see the PSA results. Is that available for me to look at?     Thank you  Shahbaz Epperson  [phone]

## 2023-04-24 NOTE — TELEPHONE ENCOUNTER
Spoke to Lucrecia Pickett at [facility] she is adding a PSA total to labs done 4/19/23. The original order for PSA was placed at [facility] in error.

## 2023-04-25 LAB
ABSOLUTE BASOPHILS: 23 CELLS/UL (ref 0–200)
ABSOLUTE EOSINOPHILS: 108 CELLS/UL (ref 15–500)
ABSOLUTE LYMPHOCYTES: 2279 CELLS/UL (ref 850–3900)
ABSOLUTE MONOCYTES: 655 CELLS/UL (ref 200–950)
ABSOLUTE NEUTROPHILS: 4635 CELLS/UL (ref 1500–7800)
ALBUMIN/GLOBULIN RATIO: 1.7 (CALC) (ref 1–2.5)
ALBUMIN: 4.1 G/DL (ref 3.6–5.1)
ALKALINE PHOSPHATASE: 85 U/L (ref 35–144)
ALT: 20 U/L (ref 9–46)
AST: 19 U/L (ref 10–35)
BASOPHILS: 0.3 %
BILIRUBIN, TOTAL: 1 MG/DL (ref 0.2–1.2)
BUN: 17 MG/DL (ref 7–25)
CALCIUM: 9.1 MG/DL (ref 8.6–10.3)
CARBON DIOXIDE: 28 MMOL/L (ref 20–32)
CHLORIDE: 106 MMOL/L (ref 98–110)
CHOL/HDLC RATIO: 3.6 (CALC)
CHOLESTEROL, TOTAL: 130 MG/DL
CREATININE: 1.08 MG/DL (ref 0.7–1.28)
EGFR: 73 ML/MIN/1.73M2
EOSINOPHILS: 1.4 %
GLOBULIN: 2.4 G/DL (CALC) (ref 1.9–3.7)
GLUCOSE: 99 MG/DL (ref 65–99)
HDL CHOLESTEROL: 36 MG/DL
HEMATOCRIT: 45.4 % (ref 38.5–50)
HEMOGLOBIN A1C: 5.7 % OF TOTAL HGB
HEMOGLOBIN: 15.1 G/DL (ref 13.2–17.1)
LDL-CHOLESTEROL: 71 MG/DL (CALC)
LYMPHOCYTES: 29.6 %
MCH: 30.6 PG (ref 27–33)
MCHC: 33.3 G/DL (ref 32–36)
MCV: 92.1 FL (ref 80–100)
MONOCYTES: 8.5 %
MPV: 9.5 FL (ref 7.5–12.5)
NEUTROPHILS: 60.2 %
NON-HDL CHOLESTEROL: 94 MG/DL (CALC)
PLATELET COUNT: 181 THOUSAND/UL (ref 140–400)
POTASSIUM: 4.4 MMOL/L (ref 3.5–5.3)
PROTEIN, TOTAL: 6.5 G/DL (ref 6.1–8.1)
PSA, TOTAL: 2.59 NG/ML
RDW: 13 % (ref 11–15)
RED BLOOD CELL COUNT: 4.93 MILLION/UL (ref 4.2–5.8)
SODIUM: 141 MMOL/L (ref 135–146)
TRIGLYCERIDES: 144 MG/DL
WHITE BLOOD CELL COUNT: 7.7 THOUSAND/UL (ref 3.8–10.8)

## 2023-06-17 DIAGNOSIS — E78.2 MIXED HYPERLIPIDEMIA: ICD-10-CM

## 2023-06-17 RX ORDER — ATORVASTATIN CALCIUM 40 MG/1
TABLET, FILM COATED ORAL
Qty: 100 TABLET | Refills: 2 | OUTPATIENT
Start: 2023-06-17

## 2023-06-20 RX ORDER — ATORVASTATIN CALCIUM 40 MG/1
TABLET, FILM COATED ORAL
Qty: 100 TABLET | Refills: 2 | OUTPATIENT
Start: 2023-06-20

## 2023-08-24 DIAGNOSIS — E78.2 MIXED HYPERLIPIDEMIA: ICD-10-CM

## 2023-08-25 RX ORDER — ATORVASTATIN CALCIUM 40 MG/1
40 TABLET, FILM COATED ORAL NIGHTLY
Qty: 90 TABLET | Refills: 0 | Status: SHIPPED | OUTPATIENT
Start: 2023-08-25

## 2023-09-13 DIAGNOSIS — F41.1 GENERALIZED ANXIETY DISORDER: ICD-10-CM

## 2023-09-14 RX ORDER — ESCITALOPRAM OXALATE 20 MG/1
20 TABLET ORAL DAILY
Qty: 90 TABLET | Refills: 0 | Status: SHIPPED | OUTPATIENT
Start: 2023-09-14

## 2023-10-02 ENCOUNTER — OFFICE VISIT (OUTPATIENT)
Dept: FAMILY MEDICINE CLINIC | Facility: CLINIC | Age: 73
End: 2023-10-02
Payer: COMMERCIAL

## 2023-10-02 VITALS
HEART RATE: 90 BPM | HEIGHT: 68 IN | SYSTOLIC BLOOD PRESSURE: 124 MMHG | BODY MASS INDEX: 31.98 KG/M2 | DIASTOLIC BLOOD PRESSURE: 70 MMHG | WEIGHT: 211 LBS | RESPIRATION RATE: 14 BRPM | TEMPERATURE: 97 F

## 2023-10-02 DIAGNOSIS — R93.1 ELEVATED CORONARY ARTERY CALCIUM SCORE: ICD-10-CM

## 2023-10-02 DIAGNOSIS — E78.2 MIXED HYPERLIPIDEMIA: Primary | ICD-10-CM

## 2023-10-02 DIAGNOSIS — F41.1 GENERALIZED ANXIETY DISORDER: ICD-10-CM

## 2023-10-02 DIAGNOSIS — R73.01 IMPAIRED FASTING GLUCOSE: ICD-10-CM

## 2023-10-02 PROCEDURE — 1160F RVW MEDS BY RX/DR IN RCRD: CPT | Performed by: FAMILY MEDICINE

## 2023-10-02 PROCEDURE — 3008F BODY MASS INDEX DOCD: CPT | Performed by: FAMILY MEDICINE

## 2023-10-02 PROCEDURE — 3074F SYST BP LT 130 MM HG: CPT | Performed by: FAMILY MEDICINE

## 2023-10-02 PROCEDURE — 1170F FXNL STATUS ASSESSED: CPT | Performed by: FAMILY MEDICINE

## 2023-10-02 PROCEDURE — 99214 OFFICE O/P EST MOD 30 MIN: CPT | Performed by: FAMILY MEDICINE

## 2023-10-02 PROCEDURE — 3078F DIAST BP <80 MM HG: CPT | Performed by: FAMILY MEDICINE

## 2023-10-02 PROCEDURE — 1159F MED LIST DOCD IN RCRD: CPT | Performed by: FAMILY MEDICINE

## 2023-11-15 DIAGNOSIS — E78.2 MIXED HYPERLIPIDEMIA: ICD-10-CM

## 2023-11-15 RX ORDER — ATORVASTATIN CALCIUM 40 MG/1
40 TABLET, FILM COATED ORAL NIGHTLY
Qty: 90 TABLET | Refills: 1 | Status: SHIPPED | OUTPATIENT
Start: 2023-11-15

## 2023-12-08 ENCOUNTER — PATIENT MESSAGE (OUTPATIENT)
Dept: FAMILY MEDICINE CLINIC | Facility: CLINIC | Age: 73
End: 2023-12-08

## 2023-12-08 NOTE — TELEPHONE ENCOUNTER
From: Rei Erazo  To: Venkat MAYO  Sent: 12/8/2023 1:32 PM CST  Subject: Letter 12/8/2023, outstanding lab work    Hello, regarding the letter sent to me on 12/8 requesting lab work, this was completed in April 2023. This includes the PSA test which was noted in April as well. Please take a look at the test results from April and let me know why I would take two in one year.      Thank you  Demetrice Marie  [phone]

## 2023-12-14 LAB
ALBUMIN/GLOBULIN RATIO: 1.5 (CALC) (ref 1–2.5)
ALBUMIN: 4.1 G/DL (ref 3.6–5.1)
ALKALINE PHOSPHATASE: 94 U/L (ref 35–144)
ALT: 15 U/L (ref 9–46)
AST: 15 U/L (ref 10–35)
BILIRUBIN, TOTAL: 0.9 MG/DL (ref 0.2–1.2)
BUN: 16 MG/DL (ref 7–25)
CALCIUM: 9.3 MG/DL (ref 8.6–10.3)
CARBON DIOXIDE: 31 MMOL/L (ref 20–32)
CHLORIDE: 105 MMOL/L (ref 98–110)
CHOL/HDLC RATIO: 3.5 (CALC)
CHOLESTEROL, TOTAL: 137 MG/DL
CREATININE: 1.05 MG/DL (ref 0.7–1.28)
EGFR: 75 ML/MIN/1.73M2
GLOBULIN: 2.7 G/DL (CALC) (ref 1.9–3.7)
GLUCOSE: 102 MG/DL (ref 65–99)
HDL CHOLESTEROL: 39 MG/DL
HEMOGLOBIN A1C: 5.7 % OF TOTAL HGB
LDL-CHOLESTEROL: 77 MG/DL (CALC)
NON-HDL CHOLESTEROL: 98 MG/DL (CALC)
POTASSIUM: 4.9 MMOL/L (ref 3.5–5.3)
PROTEIN, TOTAL: 6.8 G/DL (ref 6.1–8.1)
SODIUM: 140 MMOL/L (ref 135–146)
TRIGLYCERIDES: 120 MG/DL

## 2023-12-26 DIAGNOSIS — F41.1 GENERALIZED ANXIETY DISORDER: ICD-10-CM

## 2023-12-26 RX ORDER — ESCITALOPRAM OXALATE 20 MG/1
20 TABLET ORAL DAILY
Qty: 90 TABLET | Refills: 0 | Status: SHIPPED | OUTPATIENT
Start: 2023-12-26

## 2023-12-26 NOTE — TELEPHONE ENCOUNTER
LOV:2-17-23    Last Refill:9-14-23      RTC:  No follow-ups on file.       Pharmacy [facility]       Protocol N/A         Please sign if appropriate

## 2024-01-26 DIAGNOSIS — E78.2 MIXED HYPERLIPIDEMIA: ICD-10-CM

## 2024-01-29 RX ORDER — ATORVASTATIN CALCIUM 40 MG/1
40 TABLET, FILM COATED ORAL NIGHTLY
Qty: 100 TABLET | Refills: 0 | Status: SHIPPED | OUTPATIENT
Start: 2024-01-29

## 2024-03-11 DIAGNOSIS — F41.1 GENERALIZED ANXIETY DISORDER: ICD-10-CM

## 2024-03-12 NOTE — TELEPHONE ENCOUNTER
Requested Prescriptions     Pending Prescriptions Disp Refills    ESCITALOPRAM 20 MG Oral Tab [Pharmacy Med Name: Escitalopram Oxalate 20 MG Oral Tablet] 90 tablet 3     Sig: TAKE 1 TABLET BY MOUTH DAILY     Last refill 12/26/23 #90  LOV 10/2/23  Future Appointments   Date Time Provider Department Center   4/3/2024 11:00 AM Popeye Herrera,  [facility] [id number]

## 2024-03-13 RX ORDER — ESCITALOPRAM OXALATE 20 MG/1
20 TABLET ORAL DAILY
Qty: 90 TABLET | Refills: 0 | Status: SHIPPED | OUTPATIENT
Start: 2024-03-13

## 2024-04-03 ENCOUNTER — OFFICE VISIT (OUTPATIENT)
Dept: FAMILY MEDICINE CLINIC | Facility: CLINIC | Age: 74
End: 2024-04-03
Payer: COMMERCIAL

## 2024-04-03 VITALS
RESPIRATION RATE: 18 BRPM | OXYGEN SATURATION: 98 % | TEMPERATURE: 98 F | DIASTOLIC BLOOD PRESSURE: 74 MMHG | HEART RATE: 82 BPM | WEIGHT: 210 LBS | HEIGHT: 68 IN | SYSTOLIC BLOOD PRESSURE: 120 MMHG | BODY MASS INDEX: 31.83 KG/M2

## 2024-04-03 DIAGNOSIS — Z00.00 ENCOUNTER FOR MEDICARE ANNUAL WELLNESS EXAM: Primary | ICD-10-CM

## 2024-04-03 DIAGNOSIS — G47.33 OSA (OBSTRUCTIVE SLEEP APNEA): ICD-10-CM

## 2024-04-03 DIAGNOSIS — Z12.5 PROSTATE CANCER SCREENING: ICD-10-CM

## 2024-04-03 DIAGNOSIS — R93.1 ELEVATED CORONARY ARTERY CALCIUM SCORE: ICD-10-CM

## 2024-04-03 DIAGNOSIS — R73.01 IMPAIRED FASTING GLUCOSE: ICD-10-CM

## 2024-04-03 DIAGNOSIS — E66.9 OBESITY, CLASS I, BMI 30-34.9: ICD-10-CM

## 2024-04-03 DIAGNOSIS — E78.2 MIXED HYPERLIPIDEMIA: ICD-10-CM

## 2024-04-03 DIAGNOSIS — F41.1 GENERALIZED ANXIETY DISORDER: ICD-10-CM

## 2024-04-03 DIAGNOSIS — I70.0 ATHEROSCLEROSIS OF AORTA (HCC): ICD-10-CM

## 2024-04-03 DIAGNOSIS — R41.3 MEMORY CHANGES: ICD-10-CM

## 2024-04-03 NOTE — PROGRESS NOTES
HPI:   Renny Wolf is a 73 year old male who presents for a MA (Medicare Advantage) Supervisit (Once per calendar year).    Patient has no complaints or concerns today.  Denies any recent illnesses or hospitalizations.  His tetanus vaccine is up-to-date.  Pneumonia vaccines are up to date.  Colonoscopy up to date.    Has some difficulty with word recall from time to time.  Mom had Alzheimer's Disease.         Patient Care Team: Patient Care Team:  Popeye Herrera DO as PCP - General (Family Medicine)    Patient Active Problem List   Diagnosis    Atherosclerosis of aorta (HCC)    Anxiety disorder    Obesity, Class I, BMI 30-34.9    Mixed hyperlipidemia    Impaired fasting glucose    Obstructive sleep apnea on CPAP    Elevated coronary artery calcium score       Last Cholesterol Labs:   Lab Results   Component Value Date    CHOLEST 137 12/13/2023    HDL 39 (L) 12/13/2023    LDL 77 12/13/2023    TRIG 120 12/13/2023          Last Chemistry Labs:   Lab Results   Component Value Date    AST 15 12/13/2023    ALT 15 12/13/2023    CA 9.3 12/13/2023    ALB 4.1 12/13/2023    TSH 2.610 06/30/2017    CREATSERUM 1.05 12/13/2023     (H) 12/13/2023        CBC  (most recent labs)   Lab Results   Component Value Date    WBC 7.7 04/19/2023    HGB 15.1 04/19/2023     04/19/2023        ALLERGIES:   He has No Known Allergies.    CURRENT MEDICATIONS:   Outpatient Medications Marked as Taking for the 4/3/24 encounter (Office Visit) with Popeye Herrera DO   Medication Sig    escitalopram 20 MG Oral Tab Take 1 tablet (20 mg total) by mouth daily.    atorvastatin 40 MG Oral Tab Take 1 tablet (40 mg total) by mouth nightly.    Cholecalciferol (VITAMIN D) 2000 UNITS Oral Tab Take by mouth.    aspirin 81 MG Oral Tab Take 1 tablet (81 mg total) by mouth daily.      MEDICAL INFORMATION:   He  has a past medical history of Acute URI (02/01/2012), Anxiety (2001), Anxiety disorder, Aortic calcification (HCC) (01/24/2012),  Cardiac infarction (01/01/2012), Hypercholesterolemia, Hyperlipidemia (2018), Laboratory test (01/06/2012), Lipid screening (01/04/2012), Routine general medical examination at a health care facility (12/29/2011), Stye (06/01/2012), URI (upper respiratory infection) (02/29/2012), and Vascular calcification (02/17/2012).    He  has a past surgical history that includes cardiac catherterization (pbp) (01/24/2012); angioplasty (coronary) (2012); colonoscopy (2018); tonsillectomy (1956); and vasectomy (1981).    His family history includes AHD in an other family member; CABG in his mother; CAD in an other family member; Dementia in his mother; MI, (age of onset: 49) in his father; confusion,back prolems in his mother; pacemaker in his maternal grandmother.   SOCIAL HISTORY:   He  reports that he has never smoked. He has never used smokeless tobacco. He reports that he does not currently use alcohol. He reports that he does not use drugs.     REVIEW OF SYSTEMS:   GENERAL: feels well otherwise  SKIN: denies any unusual skin lesions  EYES: denies blurred vision or double vision  HEENT: denies nasal congestion, sinus pain or ST  LUNGS: denies shortness of breath with exertion, denies cough  CARDIOVASCULAR: denies chest pain on exertion or at rest, denies palpitations  GI: denies abdominal pain, denies heartburn, denies n/v/d/c/blood in stool  : 1 per night nocturia, no complaint of urinary incontinence  MUSCULOSKELETAL: denies back pain  NEURO: denies headaches  PSYCHE: denies depression, + anxiety  HEMATOLOGIC: denies hx of anemia  ENDOCRINE: denies thyroid history  ALL/ASTHMA: denies hx of allergy or asthma    EXAM:   /74 (BP Location: Left arm, Patient Position: Sitting, Cuff Size: adult)   Pulse 82   Temp 98.2 °F (36.8 °C) (Oral)   Resp 18   Ht 5' 8\" (1.727 m)   Wt 210 lb (95.3 kg)   BMI 31.94 kg/m²   Estimated body mass index is 31.93 kg/m² as calculated from the following:    Height as of this encounter:  5' 8\" (1.727 m).    Weight as of this encounter: 210 lb (95.3 kg).    Medicare Hearing Assessment  (Required for AWV/SWV)    Hearing Screening      Time taken:  4/3/24   Screening Method:  Finger Rub   Finger Rub Result:  Pass                      Visual Acuity  Right Eye Visual Acuity: Corrected Right Eye Chart Acuity: 20/30   Left Eye Visual Acuity: Corrected Left Eye Chart Acuity: 20/40   Both Eyes Visual Acuity: Corrected Both Eyes Chart Acuity: 20/30   Able To Tolerate Visual Acuity: Yes      General Appearance:  Alert, cooperative, no distress, appears stated age   Head:  Normocephalic, without obvious abnormality, atraumatic   Eyes:  PERRL, conjunctiva/corneas clear, EOM's intact, both eyes   Ears:  Normal TM's and external ear canals, both ears   Nose: Nares normal, septum midline, mucosa normal, no drainage or sinus tenderness   Throat: Lips, mucosa, and tongue normal; teeth and gums normal   Neck: Supple, symmetrical, trachea midline, no adenopathy, thyroid: not enlarged, symmetric, no tenderness/mass/nodules   Back:   Symmetric, no curvature, ROM normal   Lungs:   Clear to auscultation bilaterally, respirations unlabored       Heart:  Regular rate and rhythm, S1, S2 normal, no murmur, rub or gallop   Abdomen:   Soft, non-tender, bowel sounds active all four quadrants,  no masses, no organomegaly   Genitalia: No scrotal masses or tenderness bilaterally   Rectal: Prostate minimally enlarged but no masses   Extremities: Extremities normal, atraumatic, no cyanosis or edema   Pulses: 2+ and symmetric   Skin: Skin color, texture, turgor normal, no rashes or lesions   Lymph nodes: Cervical, supraclavicular, and axillary nodes normal   Neurologic: CN 2-12 grossly intact              SUGGESTED VACCINATIONS - Influenza, Pneumococcal, Zoster, Tetanus     Immunization History   Administered Date(s) Administered    Influenza 12/24/2013    TDAP 11/18/2008, 12/29/2011       ASSESSMENT AND OTHER RELEVANT CHRONIC  CONDITIONS:   Renny Wolf is a 73 year old male who presents for a Medicare Assessment.     PLAN SUMMARY:   Diagnoses and all orders for this visit:    Encounter for Medicare annual wellness exam  -  Colonoscopy up to date  -  PSA and prostate exam done   -  prevnar up to date  -  Pneumovax up to date    TINA  -  Managed by pulmonology    Obesity, Class I, BMI 30-34.9  -  Encourage low fat/low carb diet and exercise    Mixed hyperlipidemia  -  Recheck fasting lipid panel at next OV  -  Adjust atorvastatin accordingly  -  Encourage low fat/low carb intake    Atherosclerosis of aorta (HCC)  -  Monitored by cardiology  -  Calcium score elevated  -  Continue atorvastatin    Generalized anxiety disorder  -  Stable  -  Continue lexapro 20mg daily and xanax PRN but try wean down on xanax from taking daily to PRN    Impaired fasting glucose  -  Recheck A1c at next OV  -  Encourage low carb diet and exercise    Elevated coronary calcium score  -  Monitored by cardiology  -  Continue atorvastatin     Prostate cancer screening  -  Check PSA  -  ELSIE done today    Memory changes  -  persistent  -  refer to neurology for further evaluation      Mr. Wolf already takes aspirin and has it on his medication list.     Diet assessment: good     Advanced Directive:  Living Will on file in Epic?  Renny Wolf does not have a Living Will on file in Epic. Not Discussed       Healthcare Power of  on file in Epic:    Renny Wolf does not have a Power of  for Health Care on file in [facility]. Not Discussed           PLAN:  The patient indicates understanding of these issues and agrees to the plan.    No follow-ups on file.     JEWEL MAYO, , 6/30/2017       General Health     In the past six months, have you lost more than 10 pounds without trying?: 2 - No    Has your appetite been poor?: No    How does the patient maintain a good energy level?: Daily Walks    How would you describe your daily physical activity?:  Moderate    How would you describe your current health state?: Good    How do you maintain positive mental well-being?: Social Interaction         Have you had any immunizations at another office such as Influenza, Hepatitis B, Tetanus, or Pneumococcal?: No     Functional Ability     Bathing or Showering: Able without help    Toileting: Able without help    Dressing: Able without help    Eating: Able without help    Driving: Able without help    Preparing your meals: Able without help    Managing money/bills: Able without help    Taking medications as prescribed: Able without help    Are you able to afford your medications?: Yes    Hearing Problems?: No     Functional Status     Hearing Problems?: No    Vision Problems? : Yes    Difficulty walking?: No         Problems with daily activities? : No    Memory Problems?: No      Fall/Risk Assessment                                                              Depression Screening (PHQ-2/PHQ-9): Over the LAST 2 WEEKS                       Advance Directives     Do you have a healthcare power of ?: No    Do you have a living will?: No     Please go to \"Cognitive Assessment\" under Medicare Assessment section in Charting, test patient and document.    Then, refresh your progress note to see your input here.  Cognitive Assessment     What day of the week is this?: Correct    What month is it?: Correct    What year is it?: Correct    Recall \"Ball\": Correct    Recall \"Flag\": Correct    Recall \"Tree\": Correct       This section provided for quick review of chart, separate sheet to patient  PREVENTATIVE SERVICES  INDICATIONS AND SCHEDULE Internal Lab or Procedure External Lab or Procedure   Diabetes Screening      HbgA1C   Annually HEMOGLOBIN A1c (% of total Hgb)   Date Value   12/13/2023 5.7 (H)            No data to display                Fasting Blood Sugar (FSB)Annually GLUCOSE (mg/dL)   Date Value   12/13/2023 102 (H)          Cardiovascular Disease Screening      LDL Annually LDL-CHOLESTEROL (mg/dL (calc))   Date Value   12/13/2023 77        EKG - w/ Initial Preventative Physical Exam only, or if medically necessary Electrocardiogram date    Colorectal Cancer Screening      Colonoscopy Screen every 10 years Health Maintenance   Topic Date Due    Colorectal Cancer Screening  09/12/2028    Update Health Maintenance if applicable    Flex Sigmoidoscopy Screen every 10 years No results found for this or any previous visit.      No data to display                 Fecal Occult Blood Annually No results found for: \"FOB\"      No data to display                Glaucoma Screening      Ophthalmology Visit Annually: Diabetics, FHx Glaucoma, AA>50, > 65      No data to display                Prostate Cancer Screening      PSA  Annually Health Maintenance   Topic Date Due    PSA  04/19/2025     Update Health Maintenance if applicable   Immunizations      Influenza No orders found for this or any previous visit. Update Immunization Activity if applicable    Pneumoccocal 13 (Prevnar) Orders placed or performed in visit on 06/30/17    pneumococcal 13-Traci Conj Vacc (PREVNAR 13) Intramuscular Suspension         Pneumococcal 23 (Pneumovax) Orders placed or performed in visit on 07/09/18    pneumococcal 23-Traci Vacc (PNEUMOVAX 23) Intramuscular Suspension        Hepatitis B No orders found for this or any previous visit.     Tetanus No orders found for this or any previous visit.         SPECIFIC DISEASE MONITORING Internal Lab or Procedure External Lab or Procedure   Annual Monitoring of Persistent     Medications (ACE/ARB, digoxin diuretics, anticonvulsants.)    Potassium  Annually POTASSIUM (mmol/L)   Date Value   12/13/2023 4.9         No data to display                Creatinine  Annually CREATININE (mg/dL)   Date Value   12/13/2023 1.05         No data to display                Drug Serum Conc  Annually No results found for: \"DIGOXIN\", \"DIG\", \"VALP\"      No data to display                 Diabetes      HgbA1C  Annually HEMOGLOBIN A1c (% of total Hgb)   Date Value   12/13/2023 5.7 (H)            No data to display                Creat/alb ratio  Annually      LDL  Annually LDL-CHOLESTEROL (mg/dL (calc))   Date Value   12/13/2023 77         No data to display                 Dilated Eye exam  Annually      No data to display                   No data to display                COPD      Spirometry Testing Annually Spirometry date:       No data to display

## 2024-04-06 DIAGNOSIS — E78.2 MIXED HYPERLIPIDEMIA: ICD-10-CM

## 2024-04-08 RX ORDER — ATORVASTATIN CALCIUM 40 MG/1
40 TABLET, FILM COATED ORAL NIGHTLY
Qty: 100 TABLET | Refills: 2 | Status: SHIPPED | OUTPATIENT
Start: 2024-04-08

## 2024-04-09 ENCOUNTER — TELEPHONE (OUTPATIENT)
Dept: FAMILY MEDICINE CLINIC | Facility: CLINIC | Age: 74
End: 2024-04-09

## 2024-04-09 DIAGNOSIS — Z12.5 PROSTATE CANCER SCREENING: Primary | ICD-10-CM

## 2024-04-09 NOTE — TELEPHONE ENCOUNTER
Called [facility] and spoke with Tricia and added PSA to specimen done yesterday.    Pt informed of above and voiced understanding.

## 2024-04-09 NOTE — TELEPHONE ENCOUNTER
Pt had labs drawn at [facility] and is asking why PSA was not completed, was ordered to [facility] incorrectly.    Please change PSA lab order to [facility].      Please notify pt when order has been corrected.

## 2024-04-10 LAB
ABSOLUTE BASOPHILS: 21 CELLS/UL (ref 0–200)
ABSOLUTE EOSINOPHILS: 98 CELLS/UL (ref 15–500)
ABSOLUTE LYMPHOCYTES: 2135 CELLS/UL (ref 850–3900)
ABSOLUTE MONOCYTES: 623 CELLS/UL (ref 200–950)
ABSOLUTE NEUTROPHILS: 4123 CELLS/UL (ref 1500–7800)
ALBUMIN/GLOBULIN RATIO: 1.8 (CALC) (ref 1–2.5)
ALBUMIN: 4.1 G/DL (ref 3.6–5.1)
ALKALINE PHOSPHATASE: 81 U/L (ref 35–144)
ALT: 12 U/L (ref 9–46)
AST: 13 U/L (ref 10–35)
BASOPHILS: 0.3 %
BILIRUBIN, TOTAL: 0.7 MG/DL (ref 0.2–1.2)
BUN: 17 MG/DL (ref 7–25)
CALCIUM: 9.2 MG/DL (ref 8.6–10.3)
CARBON DIOXIDE: 30 MMOL/L (ref 20–32)
CHLORIDE: 104 MMOL/L (ref 98–110)
CREATININE: 1.13 MG/DL (ref 0.7–1.28)
EGFR: 69 ML/MIN/1.73M2
EOSINOPHILS: 1.4 %
GLOBULIN: 2.3 G/DL (CALC) (ref 1.9–3.7)
GLUCOSE: 85 MG/DL (ref 65–99)
HEMATOCRIT: 44.1 % (ref 38.5–50)
HEMOGLOBIN: 14.6 G/DL (ref 13.2–17.1)
LYMPHOCYTES: 30.5 %
MCH: 30.1 PG (ref 27–33)
MCHC: 33.1 G/DL (ref 32–36)
MCV: 90.9 FL (ref 80–100)
MONOCYTES: 8.9 %
MPV: 9.2 FL (ref 7.5–12.5)
NEUTROPHILS: 58.9 %
PLATELET COUNT: 185 THOUSAND/UL (ref 140–400)
POTASSIUM: 4.1 MMOL/L (ref 3.5–5.3)
PROTEIN, TOTAL: 6.4 G/DL (ref 6.1–8.1)
PSA, TOTAL: 3.41 NG/ML
RDW: 13 % (ref 11–15)
RED BLOOD CELL COUNT: 4.85 MILLION/UL (ref 4.2–5.8)
SODIUM: 139 MMOL/L (ref 135–146)
WHITE BLOOD CELL COUNT: 7 THOUSAND/UL (ref 3.8–10.8)

## 2024-07-08 DIAGNOSIS — F41.1 GENERALIZED ANXIETY DISORDER: ICD-10-CM

## 2024-07-08 RX ORDER — ESCITALOPRAM OXALATE 20 MG/1
20 TABLET ORAL DAILY
Qty: 90 TABLET | Refills: 0 | Status: SHIPPED | OUTPATIENT
Start: 2024-07-08

## 2024-07-08 NOTE — TELEPHONE ENCOUNTER
Did not pass protocol  Last office visit  4/3/2024  Last refill was: 3/13/2024  90, __tabs  Next appointment: none scheduled     Please sign pended medication if appropriate            Medication Quantity Refills Start End   escitalopram 20 MG Oral Tab 90 tablet 0 3/13/2024 --   Sig:   Take 1 tablet (20 mg total) by mouth daily.     Route:   Oral

## 2024-07-16 ENCOUNTER — OFFICE VISIT (OUTPATIENT)
Dept: NEUROLOGY | Facility: CLINIC | Age: 74
End: 2024-07-16
Payer: COMMERCIAL

## 2024-07-16 ENCOUNTER — LAB ENCOUNTER (OUTPATIENT)
Dept: LAB | Age: 74
End: 2024-07-16
Attending: Other
Payer: MEDICARE

## 2024-07-16 VITALS
BODY MASS INDEX: 32 KG/M2 | SYSTOLIC BLOOD PRESSURE: 118 MMHG | WEIGHT: 210 LBS | HEART RATE: 78 BPM | DIASTOLIC BLOOD PRESSURE: 63 MMHG | RESPIRATION RATE: 17 BRPM

## 2024-07-16 DIAGNOSIS — R41.3 MEMORY LOSS: Primary | ICD-10-CM

## 2024-07-16 PROCEDURE — 84443 ASSAY THYROID STIM HORMONE: CPT | Performed by: OTHER

## 2024-07-16 PROCEDURE — 82607 VITAMIN B-12: CPT | Performed by: OTHER

## 2024-07-16 PROCEDURE — 99204 OFFICE O/P NEW MOD 45 MIN: CPT | Performed by: OTHER

## 2024-07-16 PROCEDURE — 3078F DIAST BP <80 MM HG: CPT | Performed by: OTHER

## 2024-07-16 PROCEDURE — 1159F MED LIST DOCD IN RCRD: CPT | Performed by: OTHER

## 2024-07-16 PROCEDURE — 3074F SYST BP LT 130 MM HG: CPT | Performed by: OTHER

## 2024-07-16 NOTE — PROGRESS NOTES
Pt reports he has noticed memory decline over the last 6 months.  He reports he has difficulty with word or information recall.    Pt's spouse accompanied patient to today's visit.  Spouse reports that she has noticed patient had had difficulty with problem solving, ie \"looking at remote and not knowing how to use it\". Notes pt has difficulty \"getting words out\".     Spouse reports, pt has \"outbursts\" during sleep.     Pt reports no difficulty driving.

## 2024-07-16 NOTE — H&P
Neurology H&P    Renny Wolf Patient Status:  No patient class for patient encounter    1950 MRN [id number]   Location [facility], [address] [place] [address] Attending No att. providers found   Hosp Day # 0 PCP JEWEL MAYO, DO     Subjective:  Renny Wolf is a(n) 73 year old male. He comes with his wife today. He states that he has problem with memory recall. He has word finding difficulty. He thinks that his has been going on for 6-12 months. Per wife it seems like he knows what he wants to say but just can't formulate the thought in his head to get the words out. She gives an example of looking at the TV remote and not knowing what to do with it. He had a mother with dementia. 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 He drives and has no difficulty navigating and has not had any unexplained accidents or damage to the cars. His wife takes care of the finances and sort of always has. He can manage his own finance though. He takes care of his own ADLs. He denies any changes in behavior or delusion or hallucinations. He is not forgetting important dates or family members names.     Current Medications:  Current Outpatient Medications   Medication Sig Dispense Refill    escitalopram 20 MG Oral Tab Take 1 tablet (20 mg total) by mouth daily. 90 tablet 0    atorvastatin 40 MG Oral Tab Take 1 tablet (40 mg total) by mouth nightly. 100 tablet 2    Cholecalciferol (VITAMIN D) 2000 UNITS Oral Tab Take by mouth.      aspirin 81 MG Oral Tab Take 1 tablet (81 mg total) by mouth daily.         Problem List:  Patient Active Problem List   Diagnosis    Atherosclerosis of aorta (HCC)    Anxiety disorder    Obesity, Class I, BMI 30-34.9    Mixed hyperlipidemia    Impaired fasting glucose    Obstructive sleep apnea on CPAP    Elevated coronary artery calcium score       PMHx:  Past Medical History:    Acute URI    Anxiety    Anxiety disorder    Aortic calcification (HCC)     Cardiac infarction    small,nontransmural infarction involving the apical anterior wall. No evidence of ischemia    Hypercholesterolemia    Hyperlipidemia    Laboratory test    Hgb A1c 5.6    Lipid screening    Routine general medical examination at a health care facility    Stye    right red eye with pimple    URI (upper respiratory infection)    Vascular calcification       PSHx:  Past Surgical History:   Procedure Laterality Date    Angioplasty (coronary)  2012    Cardiac catherterization (pbp)  01/24/2012    left heart cath with selective coronary arteriography, left ventriculography and right femoral arteriography    Colonoscopy  2018    Tonsillectomy  1956    Vasectomy  1981       SocHx:  Social History     Socioeconomic History    Marital status:    Tobacco Use    Smoking status: Never    Smokeless tobacco: Never   Vaping Use    Vaping status: Never Used   Substance and Sexual Activity    Alcohol use: Not Currently     Comment: occasionally    Drug use: No    Sexual activity: Yes   Other Topics Concern    Caffeine Concern No     Comment: occ tea    Stress Concern No    Weight Concern Yes     Comment: need to lose some weight    Special Diet No    Exercise Yes     Comment: walking    Seat Belt Yes       Family History:  Family History   Problem Relation Age of Onset    Other (MI,) Father 49        heavy smoker    Other (CABG) Mother         70's    Other (confusion,back prolems) Mother     Dementia Mother     Other (CAD) Other     Other (AHD) Other     Other (pacemaker) Maternal Grandmother            ROS:  10 point ROS completed and was negative, except for pertinent positive and negatives stated in subjective.    Objective/Physical Exam:    Vital Signs:  Blood pressure 118/63, pulse 78, resp. rate 17, weight 209 lb 15.8 oz (95.2 kg).    Gen: Awake and in no apparent distress  HEENT: moist mucus membranes  Neck: Supple  Cardiovascular: Regular rate and rhythm, no murmur  Pulm: CTAB  GI: non-tender,  normal bowel sounds  Skin: normal, dry  Extremities: No clubbing or cyanosis      Neurologic:   MENTAL STATUS:   MOCA: 7/16/24  Visuospatial/executive 3/5  Naming 3/3  Attention 3/6  Language 0/3  Abstraction 2/2  Delayed Recall 0/5  Orientation 3/6  Total: 17/30      CRANIAL NERVES II to XII: PERRLA, no ptosis or diplopia, EOM intact, facial sensation intact, strong eye closure, face is symmetric, no dysarthria, tongue midline,  no tongue fasciculations or atrophy, strong shoulder shrug.    MOTOR EXAMINATION: normal tone, no fasciculations, normal strength throughout in UEs and LEs      SENSORY EXAMINATION:  UE: intact to light touch, pinprick intact  LE: intact to light touch, pinprick intact    COORDINATION:  No dysmetria, mild kinetic tremors     REFLEXES: 2+ at biceps, 2+ brachioradialis, 2+ at patella, 2+ at the ankles     GAIT: normal stance, normal toe gait and heel gait          Labs:       Imaging:  No CNS imagine to     Assessment:  This is a 72 y/o male with progressive short term memory loss and word finding difficulty. HIs MOCA today was 17/30.  He seems however on exam to give up fairly easily during testing and some portions of the MoCA just states \"no nevermind I cannot do this\" and stopped taking the test.  I question whether or not he has some sort of anxiety or depression component to his memory loss.  Dementia versus more severe mild cog impairment is still a possibility however.  I will get an MRI off the brain and a B12 and TSH level. I will also refer him to neuropsychology for formal cognitive testing.  Will hold off on starting any sort of acetylcholinesterase inhibitor until he returns following MRI imaging and formal cognitive evaluation.      Plan:  Memory loss  - MOCA 7/16/24 /30  - MRI brain  - TSH, B12  - Neuropsychology referral placed      Popeye Bautista DO

## 2024-07-16 NOTE — PATIENT INSTRUCTIONS
After your visit at the [facility] today,  please direct any follow up questions or medication needs to the staff in our [place] office so that your concerns may be promptly addressed.  We are available through [employer] or at the numbers below:    The phone number is:   [phone] option #1    The fax number is:  [phone]    Your pharmacy should also send any requests electronically to the [place] office.  Refill policies:    Allow 2-3 business days for refills; controlled substances may take longer.  Contact your pharmacy at least 5 days prior to running out of medication and have them send an electronic request or submit request through the “request refill” option in your [employer] account.  Refills are not addressed on weekends; covering physicians do not authorize routine medications on weekends.  No narcotics or controlled substances are refilled after noon on Fridays or by on call physicians.  By law, narcotics must be electronically prescribed.  A 30 day supply with no refills is the maximum allowed.  If your prescription is due for a refill, you may be due for a follow up appointment.  To best provide you care, patients receiving routine medications need to be seen at least once a year.  Patients receiving narcotic/controlled substance medications need to be seen at least once every 3 months.  In the event that your preferred pharmacy does not have the requested medication in stock (e.g. Backordered), it is your responsibility to find another pharmacy that has the requested medication available.  We will gladly send a new prescription to that pharmacy at your request.    Scheduling Tests:    If your physician has ordered radiology tests such as MRI or CT scans, please contact Central Scheduling at [phone] right away to schedule the test.  Once scheduled, the [facility] Centralized Referral Team will work with your insurance carrier to obtain pre-certification or prior authorization.   Depending on your insurance carrier, approval may take 3-10 days.  It is highly recommended patients assure they have received an authorization before having a test performed.  If test is done without insurance authorization, patient may be responsible for the entire amount billed.      Precertification and Prior Authorizations:  If your physician has recommended that you have a procedure or additional testing performed the [facility] Centralized Referral Team will contact your insurance carrier to obtain pre-certification or prior authorization.    You are strongly encouraged to contact your insurance carrier to verify that your procedure/test has been approved and is a COVERED benefit.  Although the [facility] Centralized Referral Team does its due diligence, the insurance carrier gives the disclaimer that \"Although the procedure is authorized, this does not guarantee payment.\"    Ultimately the patient is responsible for payment.   Thank you for your understanding in this matter.  Paperwork Completion:  If you require FMLA or disability paperwork for your recovery, please make sure to either drop it off or have it faxed to our office at [phone]. Be sure the form has your name and date of birth on it.  The form will be faxed to our Forms Department and they will complete it for you.  There is a 25$ fee for all forms that need to be filled out.  Please be aware there is a 10-14 day turnaround time.  You will need to sign a release of information (LAY) form if your paperwork does not come with one.  You may call the Forms Department with any questions at [phone].  Their fax number is [phone].

## 2024-07-17 LAB
TSI SER-ACNC: 3.12 MIU/ML (ref 0.55–4.78)
VIT B12 SERPL-MCNC: 530 PG/ML (ref 211–911)

## 2024-07-30 ENCOUNTER — TELEPHONE (OUTPATIENT)
Dept: FAMILY MEDICINE CLINIC | Facility: CLINIC | Age: 74
End: 2024-07-30

## 2024-07-30 NOTE — TELEPHONE ENCOUNTER
Medical Records Request received from [email or web address] for records from 01/01/2023 - 12/31/2023.     Release original sent to [facility] on 07/30/2024. Copy sent to scanning.

## 2024-08-26 ENCOUNTER — TELEPHONE (OUTPATIENT)
Dept: NEUROLOGY | Facility: CLINIC | Age: 74
End: 2024-08-26

## 2024-08-26 NOTE — TELEPHONE ENCOUNTER
Patient's wife (Sussy) informed of insurance saying they do not need a referral for an MRI. Sussy voiced understanding.

## 2024-08-26 NOTE — TELEPHONE ENCOUNTER
Patient wife would like a call back about getting authorize for his mri and she also has questions for the nurses to call her back

## 2024-08-27 ENCOUNTER — HOSPITAL ENCOUNTER (OUTPATIENT)
Dept: MRI IMAGING | Facility: HOSPITAL | Age: 74
Discharge: HOME | End: 2024-08-27
Attending: Other
Payer: MEDICARE

## 2024-08-27 DIAGNOSIS — R41.3 MEMORY LOSS: ICD-10-CM

## 2024-08-27 PROCEDURE — 70551 MRI BRAIN STEM W/O DYE: CPT | Performed by: OTHER

## 2024-09-09 DIAGNOSIS — F41.1 GENERALIZED ANXIETY DISORDER: ICD-10-CM

## 2024-09-09 RX ORDER — ESCITALOPRAM OXALATE 20 MG/1
20 TABLET ORAL DAILY
Qty: 90 TABLET | Refills: 0 | Status: SHIPPED | OUTPATIENT
Start: 2024-09-09

## 2024-09-09 NOTE — TELEPHONE ENCOUNTER
Did not pass protocol  Last office visit  4/3/2024  Last refill was: , 7/8/2024__tabs  Next appointment:  nonne scheduled    Please sign pended medication if appropriate     Medication Quantity Refills Start End   escitalopram 20 MG Oral Tab 90 tablet 0 7/8/2024 --   Sig:   Take 1 tablet (20 mg total) by mouth daily.     Route:   Oral

## 2024-10-16 PROCEDURE — 87086 URINE CULTURE/COLONY COUNT: CPT | Performed by: FAMILY MEDICINE

## 2024-12-17 DIAGNOSIS — E78.2 MIXED HYPERLIPIDEMIA: ICD-10-CM

## 2024-12-19 RX ORDER — ATORVASTATIN CALCIUM 40 MG/1
40 TABLET, FILM COATED ORAL NIGHTLY
Qty: 100 TABLET | Refills: 0 | Status: SHIPPED | OUTPATIENT
Start: 2024-12-19

## 2024-12-19 NOTE — TELEPHONE ENCOUNTER
Last Office Visit: 10/16/24  Last Refill: 4/8/24  Return to Clinic: 6 months   Protocol: failed   NOV: 4/4/25    Requested Prescriptions     Pending Prescriptions Disp Refills    ATORVASTATIN 40 MG Oral Tab [Pharmacy Med Name: Atorvastatin Calcium 40 MG Oral Tablet] 100 tablet 0     Sig: TAKE 1 TABLET BY MOUTH NIGHTLY       Please approve if appropriate.     Thank you!

## 2025-01-09 DIAGNOSIS — F41.1 GENERALIZED ANXIETY DISORDER: ICD-10-CM

## 2025-01-09 RX ORDER — ESCITALOPRAM OXALATE 20 MG/1
20 TABLET ORAL DAILY
Qty: 90 TABLET | Refills: 0 | Status: SHIPPED | OUTPATIENT
Start: 2025-01-09

## 2025-01-09 NOTE — TELEPHONE ENCOUNTER
Last Office Visit: 10/16/24  Last Refill: 9/9/24  Return to Clinic: 6 months   Protocol: passed  NOV:   Requested Prescriptions     Pending Prescriptions Disp Refills    escitalopram 20 MG Oral Tab [Pharmacy Med Name: Escitalopram Oxalate 20 MG Oral Tablet] 90 tablet 0     Sig: TAKE 1 TABLET BY MOUTH DAILY

## 2025-02-24 DIAGNOSIS — E78.2 MIXED HYPERLIPIDEMIA: ICD-10-CM

## 2025-02-26 RX ORDER — ATORVASTATIN CALCIUM 40 MG/1
40 TABLET, FILM COATED ORAL NIGHTLY
Qty: 100 TABLET | Refills: 0 | Status: SHIPPED | OUTPATIENT
Start: 2025-02-26

## 2025-02-26 NOTE — TELEPHONE ENCOUNTER
Last Office Visit: 10/16/24  Last Refill: 12/19/24  Return to Clinic: 6 months   Protocol: failed   NOV: 4/4/25  Requested Prescriptions     Pending Prescriptions Disp Refills    atorvastatin 40 MG Oral Tab [Pharmacy Med Name: Atorvastatin Calcium 40 MG Oral Tablet] 100 tablet 0     Sig: TAKE 1 TABLET BY MOUTH EVERY  NIGHT         Please approve if appropriate.     Thank you!

## 2025-03-28 ENCOUNTER — TELEPHONE (OUTPATIENT)
Dept: FAMILY MEDICINE CLINIC | Facility: CLINIC | Age: 75
End: 2025-03-28

## 2025-03-28 DIAGNOSIS — Z12.5 PROSTATE CANCER SCREENING: Primary | ICD-10-CM

## 2025-03-28 NOTE — TELEPHONE ENCOUNTER
Left a detailed message on patient's voicemail stating lab orders has already been placed through [employer].  Advised to do at least to after 4/8/2025 or 1 week before his appointment.

## 2025-04-04 DIAGNOSIS — F41.1 GENERALIZED ANXIETY DISORDER: ICD-10-CM

## 2025-04-04 RX ORDER — ESCITALOPRAM OXALATE 20 MG/1
20 TABLET ORAL DAILY
Qty: 90 TABLET | Refills: 0 | Status: SHIPPED | OUTPATIENT
Start: 2025-04-04

## 2025-04-04 NOTE — TELEPHONE ENCOUNTER
Last Office Visit: 10/16/24  Last Refill: 1/9/25  Return to Clinic:   Protocol: failed   NOV: 5/7/25  Requested Prescriptions     Pending Prescriptions Disp Refills    escitalopram 20 MG Oral Tab [Pharmacy Med Name: Escitalopram Oxalate 20 MG Oral Tablet] 90 tablet 0     Sig: TAKE 1 TABLET BY MOUTH DAILY         Please approve if appropriate.     Thank you!

## 2025-05-05 DIAGNOSIS — E78.2 MIXED HYPERLIPIDEMIA: ICD-10-CM

## 2025-05-06 RX ORDER — ATORVASTATIN CALCIUM 40 MG/1
40 TABLET, FILM COATED ORAL NIGHTLY
Qty: 100 TABLET | Refills: 0 | Status: SHIPPED | OUTPATIENT
Start: 2025-05-06

## 2025-05-07 ENCOUNTER — OFFICE VISIT (OUTPATIENT)
Dept: FAMILY MEDICINE CLINIC | Facility: CLINIC | Age: 75
End: 2025-05-07
Payer: COMMERCIAL

## 2025-05-07 VITALS
WEIGHT: 218 LBS | HEIGHT: 68 IN | BODY MASS INDEX: 33.04 KG/M2 | DIASTOLIC BLOOD PRESSURE: 68 MMHG | HEART RATE: 80 BPM | SYSTOLIC BLOOD PRESSURE: 116 MMHG | TEMPERATURE: 98 F

## 2025-05-07 DIAGNOSIS — R93.1 ELEVATED CORONARY ARTERY CALCIUM SCORE: ICD-10-CM

## 2025-05-07 DIAGNOSIS — R73.01 IMPAIRED FASTING GLUCOSE: ICD-10-CM

## 2025-05-07 DIAGNOSIS — E66.811 OBESITY, CLASS I, BMI 30-34.9: ICD-10-CM

## 2025-05-07 DIAGNOSIS — I70.0 ATHEROSCLEROSIS OF AORTA: ICD-10-CM

## 2025-05-07 DIAGNOSIS — R97.20 RISING PSA LEVEL: ICD-10-CM

## 2025-05-07 DIAGNOSIS — Z00.00 ENCOUNTER FOR MEDICARE ANNUAL WELLNESS EXAM: Primary | ICD-10-CM

## 2025-05-07 DIAGNOSIS — F41.1 GENERALIZED ANXIETY DISORDER: ICD-10-CM

## 2025-05-07 DIAGNOSIS — G47.33 OBSTRUCTIVE SLEEP APNEA ON CPAP: ICD-10-CM

## 2025-05-07 DIAGNOSIS — Z12.5 PROSTATE CANCER SCREENING: ICD-10-CM

## 2025-05-07 DIAGNOSIS — R41.3 MEMORY LOSS: ICD-10-CM

## 2025-05-07 DIAGNOSIS — E78.2 MIXED HYPERLIPIDEMIA: ICD-10-CM

## 2025-05-07 DIAGNOSIS — N39.43 POST-VOID DRIBBLING: ICD-10-CM

## 2025-05-07 NOTE — PROGRESS NOTES
HPI:   Renny Wolf is a 74 year old male who presents for a MA (Medicare Advantage) Supervisit (Once per calendar year).    Patient has no complaints or concerns today.  Admits to some dribbling after voiding.  Cory any dysuria.  Last PSA wnl but rising.  Denies any recent illnesses or hospitalizations.  His tetanus vaccine is up-to-date.  Pneumonia vaccines are up to date.  Colonoscopy up to date.         Patient Care Team: Patient Care Team:  Popeye Herrera DO as PCP - General (Family Medicine)  Popeye Bautista DO as Consulting Physician (NEUROLOGY)    Patient Active Problem List   Diagnosis    Atherosclerosis of aorta    Anxiety disorder    Obesity, Class I, BMI 30-34.9    Mixed hyperlipidemia    Impaired fasting glucose    Obstructive sleep apnea on CPAP    Elevated coronary artery calcium score    Memory loss       Last Cholesterol Labs:   Lab Results   Component Value Date    CHOLEST 136 04/28/2025    HDL 40 04/28/2025    LDL 72 04/28/2025    TRIG 154 (H) 04/28/2025          Last Chemistry Labs:   Lab Results   Component Value Date    AST 18 04/28/2025    ALT 18 04/28/2025    CA 9.2 04/28/2025    ALB 4.2 04/28/2025    TSH 3.115 07/16/2024    CREATSERUM 1.07 04/28/2025     (H) 04/28/2025        CBC  (most recent labs)   Lab Results   Component Value Date    WBC 7.0 04/08/2024    HGB 14.6 04/08/2024     04/08/2024        ALLERGIES:   He has no known allergies.    CURRENT MEDICATIONS:   Outpatient Medications Marked as Taking for the 5/7/25 encounter (Office Visit) with Popeye Herrera DO   Medication Sig    atorvastatin 40 MG Oral Tab Take 1 tablet (40 mg total) by mouth nightly.    ESCITALOPRAM 20 MG Oral Tab TAKE 1 TABLET BY MOUTH DAILY    Cholecalciferol (VITAMIN D) 2000 UNITS Oral Tab Take by mouth.    aspirin 81 MG Oral Tab Take 1 tablet (81 mg total) by mouth daily.      MEDICAL INFORMATION:   He  has a past medical history of Acute URI (02/01/2012), Anxiety (2001),  Anxiety disorder, Aortic calcification (01/24/2012), Cardiac infarction (01/01/2012), Hypercholesterolemia, Hyperlipidemia (2018), Laboratory test (01/06/2012), Lipid screening (01/04/2012), Routine general medical examination at a health care facility (12/29/2011), Stye (06/01/2012), URI (upper respiratory infection) (02/29/2012), and Vascular calcification (02/17/2012).    He  has a past surgical history that includes cardiac catherterization (pbp) (01/24/2012); angioplasty (coronary) (2012); colonoscopy (2018); tonsillectomy (1956); and vasectomy (1981).    His family history includes AHD in an other family member; CABG in his mother; CAD in an other family member; Dementia in his mother; MI, (age of onset: 49) in his father; confusion,back prolems in his mother; pacemaker in his maternal grandmother.   SOCIAL HISTORY:   He  reports that he has never smoked. He has never used smokeless tobacco. He reports that he does not currently use alcohol. He reports that he does not use drugs.     REVIEW OF SYSTEMS:   GENERAL: feels well otherwise  SKIN: denies any unusual skin lesions  EYES: denies blurred vision or double vision  HEENT: denies nasal congestion, sinus pain or ST  LUNGS: denies shortness of breath with exertion, denies cough  CARDIOVASCULAR: denies chest pain on exertion or at rest, denies palpitations  GI: denies abdominal pain, denies heartburn, denies n/v/d/c/blood in stool  : 1 per night nocturia, no complaint of urinary incontinence  MUSCULOSKELETAL: denies back pain  NEURO: denies headaches  PSYCHE: denies depression, + anxiety  HEMATOLOGIC: denies hx of anemia  ENDOCRINE: denies thyroid history  ALL/ASTHMA: denies hx of allergy or asthma    EXAM:   /74 (BP Location: Left arm, Patient Position: Sitting, Cuff Size: adult)   Pulse 82   Temp 98.2 °F (36.8 °C) (Oral)   Resp 18   Ht 5' 8\" (1.727 m)   Wt 210 lb (95.3 kg)   BMI 31.94 kg/m²   Estimated body mass index is 33.15 kg/m² as  calculated from the following:    Height as of this encounter: 5' 8\" (1.727 m).    Weight as of this encounter: 218 lb (98.9 kg).    Medicare Hearing Assessment  (Required for AWV/SWV)    Hearing Screening      Time taken:  4/3/24   Screening Method:  Finger Rub   Finger Rub Result:  Pass                      Visual Acuity  Right Eye Visual Acuity: Corrected Right Eye Chart Acuity: 20/40   Left Eye Visual Acuity: Corrected Left Eye Chart Acuity: 20/30   Both Eyes Visual Acuity: Corrected Both Eyes Chart Acuity: 20/25   Able To Tolerate Visual Acuity: Yes      General Appearance:  Alert, cooperative, no distress, appears stated age   Head:  Normocephalic, without obvious abnormality, atraumatic   Eyes:  PERRL, conjunctiva/corneas clear, EOM's intact, both eyes   Ears:  Normal TM's and external ear canals, both ears   Nose: Nares normal, septum midline, mucosa normal, no drainage or sinus tenderness   Throat: Lips, mucosa, and tongue normal; teeth and gums normal   Neck: Supple, symmetrical, trachea midline, no adenopathy, thyroid: not enlarged, symmetric, no tenderness/mass/nodules   Back:   Symmetric, no curvature, ROM normal   Lungs:   Clear to auscultation bilaterally, respirations unlabored       Heart:  Regular rate and rhythm, S1, S2 normal, no murmur, rub or gallop   Abdomen:   Soft, non-tender, bowel sounds active all four quadrants,  no masses, no organomegaly   Genitalia: No scrotal masses or tenderness bilaterally   Rectal: Prostate minimally enlarged but no masses   Extremities: Extremities normal, atraumatic, no cyanosis or edema   Pulses: 2+ and symmetric   Skin: Skin color, texture, turgor normal, no rashes or lesions   Lymph nodes: Cervical, supraclavicular, and axillary nodes normal   Neurologic: CN 2-12 grossly intact              SUGGESTED VACCINATIONS - Influenza, Pneumococcal, Zoster, Tetanus     Immunization History   Administered Date(s) Administered    Influenza 12/24/2013    TDAP 11/18/2008,  12/29/2011       ASSESSMENT AND OTHER RELEVANT CHRONIC CONDITIONS:   Renny Wolf is a 74 year old male who presents for a Medicare Assessment.     PLAN SUMMARY:   Diagnoses and all orders for this visit:    Encounter for Medicare annual wellness exam  -  Colonoscopy up to date  -  PSA and prostate exam done   -  prevnar up to date  -  Pneumovax up to date    TINA  -  Managed by pulmonology    Obesity, Class I, BMI 30-34.9  -  Encourage low fat/low carb diet and exercise    Mixed hyperlipidemia  -  stable  -  continue atorvastatin  -  Encourage low fat/low carb intake    Atherosclerosis of aorta (HCC)  -  Monitored by cardiology  -  Calcium score elevated at 432 in 2022  -  Continue atorvastatin    Generalized anxiety disorder  -  Stable  -  weaning instructions provided for lexapro 10mg daily x 4 weeks then 10 mg every other day x 4 weeks then discontinue if stable    Impaired fasting glucose  -  Recheck A1c at next OV  -  Encourage low carb diet and exercise    Elevated coronary calcium score  -  Monitored by cardiology  -  Continue atorvastatin     Prostate cancer screening  -  Check PSA  -  ELSIE done today    Memory changes  -  stable  -  surveillance per neuro    Rising PSA  -  refer to urology    Postvoid dribbling  -  refer to urology      Mr. Wolf already takes aspirin and has it on his medication list.     Diet assessment: good     Advanced Directive:  Living Will on file in Epic?  Renny Wolf does not have a Living Will on file in Epic. Not Discussed       Healthcare Power of  on file in Epic:    Renny Wolf does not have a Power of  for Health Care on file in [facility]. Not Discussed           PLAN:  The patient indicates understanding of these issues and agrees to the plan.    No follow-ups on file.     JEWEL MAYO, DO      General Health     In the past six months, have you lost more than 10 pounds without trying?: (Patient-Rptd) 2 - No    Has your appetite been poor?:  (Patient-Rptd) No    How does the patient maintain a good energy level?: (Patient-Rptd) Daily Walks, Stretching    How would you describe your daily physical activity?: (Patient-Rptd) Moderate    How would you describe your current health state?: (Patient-Rptd) Good    How do you maintain positive mental well-being?: (Patient-Rptd) Social Interaction, Visiting Friends, Visiting Family         Have you had any immunizations at another office such as Influenza, Hepatitis B, Tetanus, or Pneumococcal?: (Patient-Rptd) No     Functional Ability     Bathing or Showering: (Patient-Rptd) Able without help    Toileting: (Patient-Rptd) Able without help    Dressing: (Patient-Rptd) Able without help    Eating: (Patient-Rptd) Able without help    Driving: (Patient-Rptd) Able without help    Preparing your meals: (Patient-Rptd) Able without help    Managing money/bills: (Patient-Rptd) Able without help    Taking medications as prescribed: (Patient-Rptd) Able without help    Are you able to afford your medications?: (Patient-Rptd) Yes    Hearing Problems?: (Patient-Rptd) No     Functional Status     Hearing Problems?: (Patient-Rptd) No    Vision Problems? : (Patient-Rptd) No    Difficulty walking?: (Patient-Rptd) No    Difficulty dressing or bathing?: (Patient-Rptd) No    Problems with daily activities? : (Patient-Rptd) No    Memory Problems?: (Patient-Rptd) No      Fall/Risk Assessment                                                              Depression Screening (PHQ-2/PHQ-9): Over the LAST 2 WEEKS                       Advance Directives     Do you have a healthcare power of ?: (Patient-Rptd) No    Do you have a living will?: (Patient-Rptd) No     Please go to \"Cognitive Assessment\" under Medicare Assessment section in Charting, test patient and document.    Then, refresh your progress note to see your input here.  Cognitive Assessment     What day of the week is this?: Correct    What month is it?: Correct    What  year is it?: Correct    Recall \"Ball\": Correct    Recall \"Flag\": Correct    Recall \"Tree\": Incorrect       This section provided for quick review of chart, separate sheet to patient  PREVENTATIVE SERVICES  INDICATIONS AND SCHEDULE Internal Lab or Procedure External Lab or Procedure   Diabetes Screening      HbgA1C   Annually HEMOGLOBIN A1c (%)   Date Value   04/28/2025 6.0 (H)            No data to display                Fasting Blood Sugar (FSB)Annually GLUCOSE (mg/dL)   Date Value   04/28/2025 101 (H)          Cardiovascular Disease Screening     LDL Annually LDL-CHOLESTEROL (mg/dL (calc))   Date Value   04/28/2025 72        EKG - w/ Initial Preventative Physical Exam only, or if medically necessary Electrocardiogram date    Colorectal Cancer Screening      Colonoscopy Screen every 10 years Health Maintenance   Topic Date Due    Colorectal Cancer Screening  09/12/2028    Update Health Maintenance if applicable    Flex Sigmoidoscopy Screen every 10 years No results found for this or any previous visit.      No data to display                 Fecal Occult Blood Annually No results found for: \"FOB\"      No data to display                Glaucoma Screening      Ophthalmology Visit Annually: Diabetics, FHx Glaucoma, AA>50, > 65      No data to display                Prostate Cancer Screening      PSA  Annually Health Maintenance   Topic Date Due    PSA  04/28/2027     Update Health Maintenance if applicable   Immunizations      Influenza No orders found for this or any previous visit. Update Immunization Activity if applicable    Pneumoccocal 13 (Prevnar) Orders placed or performed in visit on 06/30/17    pneumococcal 13-Traci Conj Vacc (PREVNAR 13) Intramuscular Suspension         Pneumococcal 23 (Pneumovax) Orders placed or performed in visit on 07/09/18    pneumococcal 23-Traci Vacc (PNEUMOVAX 23) Intramuscular Suspension        Hepatitis B No orders found for this or any previous visit.     Tetanus No orders  found for this or any previous visit.         SPECIFIC DISEASE MONITORING Internal Lab or Procedure External Lab or Procedure   Annual Monitoring of Persistent     Medications (ACE/ARB, digoxin diuretics, anticonvulsants.)    Potassium  Annually POTASSIUM (mmol/L)   Date Value   04/28/2025 4.1         No data to display                Creatinine  Annually CREATININE (mg/dL)   Date Value   04/28/2025 1.07         No data to display                Drug Serum Conc  Annually No results found for: \"DIGOXIN\", \"DIG\", \"VALP\"      No data to display                Diabetes      HgbA1C  Annually HEMOGLOBIN A1c (%)   Date Value   04/28/2025 6.0 (H)            No data to display                Creat/alb ratio  Annually      LDL  Annually LDL-CHOLESTEROL (mg/dL (calc))   Date Value   04/28/2025 72         No data to display                 Dilated Eye exam  Annually      No data to display                   No data to display                COPD      Spirometry Testing Annually Spirometry date:       No data to display

## 2025-06-23 ENCOUNTER — PATIENT MESSAGE (OUTPATIENT)
Dept: FAMILY MEDICINE CLINIC | Facility: CLINIC | Age: 75
End: 2025-06-23

## 2025-06-23 DIAGNOSIS — R93.1 ELEVATED CORONARY ARTERY CALCIUM SCORE: Primary | ICD-10-CM

## 2025-06-23 NOTE — TELEPHONE ENCOUNTER
LOV:  5/7/25 MA Supervisit  Per notes on 5/7/25:   Atherosclerosis of aorta (HCC)  -  Monitored by cardiology  -  Calcium score elevated at 432 in 2022  -  Continue atorvastatin    NOV: 11/10/25 Med check    Last cardiology referral:  10/2/23

## 2025-06-28 DIAGNOSIS — F41.1 GENERALIZED ANXIETY DISORDER: ICD-10-CM

## 2025-06-30 RX ORDER — ESCITALOPRAM OXALATE 20 MG/1
20 TABLET ORAL DAILY
Qty: 90 TABLET | Refills: 0 | Status: SHIPPED | OUTPATIENT
Start: 2025-06-30

## 2025-06-30 NOTE — TELEPHONE ENCOUNTER
Last Office Visit: 5/7/25  Last Refill: 5/6/25  Return to Clinic: 6 month   Protocol: passed    NOV: 11/10/25    Requested Prescriptions     Pending Prescriptions Disp Refills    escitalopram 20 MG Oral Tab [Pharmacy Med Name: Escitalopram Oxalate 20 MG Oral Tablet] 90 tablet 0     Sig: TAKE 1 TABLET BY MOUTH DAILY       Please approve if appropriate.     Thank you!

## (undated) DIAGNOSIS — F41.1 GENERALIZED ANXIETY DISORDER: ICD-10-CM

## (undated) DIAGNOSIS — E66.9 CLASS 1 OBESITY WITH BODY MASS INDEX (BMI) OF 34.0 TO 34.9 IN ADULT, UNSPECIFIED OBESITY TYPE, UNSPECIFIED WHETHER SERIOUS COMORBIDITY PRESENT: ICD-10-CM

## (undated) DIAGNOSIS — E78.2 MIXED HYPERLIPIDEMIA: ICD-10-CM

## (undated) DIAGNOSIS — E78.5 HYPERLIPIDEMIA, UNSPECIFIED HYPERLIPIDEMIA TYPE: ICD-10-CM

## (undated) DIAGNOSIS — Z13.6 SCREENING FOR CARDIOVASCULAR CONDITION: Primary | ICD-10-CM

## (undated) DIAGNOSIS — R93.1 ELEVATED CORONARY ARTERY CALCIUM SCORE: Primary | ICD-10-CM

## (undated) DIAGNOSIS — E66.9 OBESITY, CLASS I, BMI 30-34.9: ICD-10-CM

## (undated) DIAGNOSIS — R59.0 MEDIASTINAL LYMPHADENOPATHY: Primary | ICD-10-CM

## (undated) DIAGNOSIS — R97.20 RISING PSA LEVEL: Primary | ICD-10-CM

## (undated) DIAGNOSIS — I70.0 AORTIC CALCIFICATION (HCC): ICD-10-CM

## (undated) DIAGNOSIS — R59.9 ENLARGED LYMPH NODES: Primary | ICD-10-CM

## (undated) NOTE — MR AVS SNAPSHOT
[facility] [address], [address] [phone]               Thank you for choosing us for your health care visit with [address] [place] [address], .   We are glad to serve you and happy to provide you with this s Assoc Dx:  Hypercholesterolemia [E78.00]                 Results of Recent Testing       MyChart     Visit [employer]  You can access your MyChart to more actively manage your health care and view more details from this visit by going to [email or web address] 2 ½ hours per week – spread out over time Use a jose to keep you motivated   Don’t forget strength training with weights and resistance Set goals and track your progress   You don’t need to join a gym. Home exercises work great.  Put more priority on exe

## (undated) NOTE — LETTER
18    Patient: Yahir Soni  : 1950 Visit date: 2018    Dear  Dr. Rocio Alonso, DO,    Thank you for referring Yahir Soni to my practice. Please find my assessment and plan below.                 Assessment   Encounter for screening colonos